# Patient Record
Sex: FEMALE | Race: WHITE | NOT HISPANIC OR LATINO | Employment: OTHER | URBAN - METROPOLITAN AREA
[De-identification: names, ages, dates, MRNs, and addresses within clinical notes are randomized per-mention and may not be internally consistent; named-entity substitution may affect disease eponyms.]

---

## 2018-04-20 ENCOUNTER — OFFICE VISIT (OUTPATIENT)
Dept: NEUROSURGERY | Facility: CLINIC | Age: 68
End: 2018-04-20

## 2018-04-20 VITALS — WEIGHT: 148 LBS | TEMPERATURE: 97.8 F | BODY MASS INDEX: 21.92 KG/M2 | HEIGHT: 69 IN

## 2018-04-20 DIAGNOSIS — M47.812 CERVICAL SPONDYLOSIS WITHOUT MYELOPATHY: ICD-10-CM

## 2018-04-20 DIAGNOSIS — M50.30 DEGENERATIVE DISC DISEASE, CERVICAL: Primary | ICD-10-CM

## 2018-04-20 PROCEDURE — 99203 OFFICE O/P NEW LOW 30 MIN: CPT | Performed by: NEUROLOGICAL SURGERY

## 2018-04-20 RX ORDER — CETIRIZINE HYDROCHLORIDE 10 MG/1
10 TABLET ORAL DAILY
COMMUNITY

## 2018-04-20 NOTE — PROGRESS NOTES
Patient: Jeanne Love  : 1950    Primary Care Provider: PIERRE Nevarez    Requesting Provider: As above        History    Chief Complaint: Neck and left upper extremity pain.    History of Present Illness: Ms. Love is a 67-year-old retired woman who is known to my service.  I saw her in  with back problems at which time she was noted to have a spondylolisthesis and stenosis.  She also complained of some neck difficulties at that time.  Cervical traction was helpful.  About a year and a half ago she underwent right shoulder replacement.  Since that time she's had increasing neck stiffness and discomfort.  She has some pain in her left shoulder that on occasion will extend even into the forearm.  Her hand will feel heavy at times and she has some slight numbness on the left.  She's better resting her left arm.  Sitting with her arms at her side makes her symptoms worse.    Review of Systems   Constitutional: Negative for activity change, appetite change, chills, diaphoresis, fatigue, fever and unexpected weight change.   HENT: Negative for congestion, dental problem, drooling, ear discharge, ear pain, facial swelling, hearing loss, mouth sores, nosebleeds, postnasal drip, rhinorrhea, sinus pressure, sneezing, sore throat, tinnitus, trouble swallowing and voice change.    Eyes: Negative for photophobia, pain, discharge, redness, itching and visual disturbance.   Respiratory: Negative for apnea, cough, choking, chest tightness, shortness of breath, wheezing and stridor.    Cardiovascular: Negative for chest pain, palpitations and leg swelling.   Gastrointestinal: Negative for abdominal distention, abdominal pain, anal bleeding, blood in stool, constipation, diarrhea, nausea, rectal pain and vomiting.   Endocrine: Negative for cold intolerance, heat intolerance, polydipsia, polyphagia and polyuria.   Genitourinary: Negative for decreased urine volume, difficulty urinating, dysuria,  "enuresis, flank pain, frequency, genital sores, hematuria and urgency.   Musculoskeletal: Positive for arthralgias and neck pain. Negative for back pain, gait problem, joint swelling, myalgias and neck stiffness.   Skin: Negative for color change, pallor, rash and wound.   Allergic/Immunologic: Negative for environmental allergies, food allergies and immunocompromised state.   Neurological: Negative for dizziness, tremors, seizures, syncope, facial asymmetry, speech difficulty, weakness, light-headedness, numbness and headaches.   Hematological: Negative for adenopathy. Does not bruise/bleed easily.   Psychiatric/Behavioral: Negative for agitation, behavioral problems, confusion, decreased concentration, dysphoric mood, hallucinations, self-injury, sleep disturbance and suicidal ideas. The patient is not nervous/anxious and is not hyperactive.        The patient's past medical history, past surgical history, family history, and social history have been reviewed at length in the electronic medical record.    Physical Exam:   Temp 97.8 °F (36.6 °C) (Temporal Artery )   Ht 175.3 cm (69\")   Wt 67.1 kg (148 lb)   BMI 21.86 kg/m²   CONSTITUTIONAL: Patient is well-nourished, pleasant and appears stated age.  CV: Heart regular rate and rhythm without murmur, rub, or gallop.  PULMONARY: Lungs are clear to ascultation.  MUSCULOSKELETAL:  Neck tenderness to palpation is not observed.   ROM in neck is normal.  Ranging her left shoulder induces some pain.  NEUROLOGICAL:  Orientation, memory, attention span, language function, and cognition have been examined and are intact.  Strength is intact in the upper and lower extremities to direct testing.  Muscle tone is normal throughout.  Station and gait are normal.  Sensation is intact to light touch testing throughout.  Deep tendon reflexes are 1+ and symmetrical.  Alvin's Sign is negative bilaterally. No clonus is elicited.  Coordination is intact.      Medical Decision " Making    Data Review:   MRI of the cervical spine dated 3/27/18 demonstrates some mild diffuse degenerative disc disease and mild spondylosis.  There is some recess narrowing due to uncinate overgrowth bilaterally at C4-5, right greater than left.    Diagnosis:   1.  Cervical spondylosis with potential low-grade left upper extremity radiculopathy.  2.  Primary shoulder dysfunction.    Treatment Options:   I have referred the patient for physical therapy that will include traction.  She'll follow-up in my clinic in several weeks to check on her progress.  Findings on her cervical study are modest and I would certainly try and avoid aggressive interventions if at all possible.       Diagnosis Plan   1. Degenerative disc disease, cervical  Ambulatory Referral to Physical Therapy Evaluate and treat   2. Cervical spondylosis without myelopathy         Scribed for Kelby Arredondo MD by Josie Smith CMA on 04/20/2018 at 10:20 AM      I, Dr. Arredondo, personally performed the services described in the documentation, as scribed in my presence, and it is both accurate and complete.

## 2019-11-19 ENCOUNTER — LAB REQUISITION (OUTPATIENT)
Dept: LAB | Facility: HOSPITAL | Age: 69
End: 2019-11-19

## 2019-11-19 ENCOUNTER — OUTSIDE FACILITY SERVICE (OUTPATIENT)
Dept: GASTROENTEROLOGY | Facility: CLINIC | Age: 69
End: 2019-11-19

## 2019-11-19 DIAGNOSIS — R13.10 DYSPHAGIA, UNSPECIFIED: ICD-10-CM

## 2019-11-19 DIAGNOSIS — K21.00 GASTRO-ESOPHAGEAL REFLUX DISEASE WITH ESOPHAGITIS: ICD-10-CM

## 2019-11-19 PROCEDURE — 43249 ESOPH EGD DILATION <30 MM: CPT | Performed by: INTERNAL MEDICINE

## 2019-11-19 PROCEDURE — G0500 MOD SEDAT ENDO SERVICE >5YRS: HCPCS | Performed by: INTERNAL MEDICINE

## 2019-11-19 PROCEDURE — 43239 EGD BIOPSY SINGLE/MULTIPLE: CPT | Performed by: INTERNAL MEDICINE

## 2019-11-19 PROCEDURE — 88305 TISSUE EXAM BY PATHOLOGIST: CPT | Performed by: INTERNAL MEDICINE

## 2019-11-20 LAB
CYTO UR: NORMAL
LAB AP CASE REPORT: NORMAL
LAB AP CLINICAL INFORMATION: NORMAL
PATH REPORT.FINAL DX SPEC: NORMAL
PATH REPORT.GROSS SPEC: NORMAL

## 2020-06-24 ENCOUNTER — TELEPHONE (OUTPATIENT)
Dept: GASTROENTEROLOGY | Facility: CLINIC | Age: 70
End: 2020-06-24

## 2020-06-24 NOTE — TELEPHONE ENCOUNTER
PT CALLED TO SEE IF WE RECEIVED HER REFERRAL, I LOOKED AT HER CHART AND SHE HAD AN EGD WITH DR POST. PT EXPRESSED THAT SHE DID NOT WANT TO SEE DR POST. I LET HER KNOW ONCE WE RECEIVED HER REFERRAL I WOULD SEND A NOTE TO THE DRS TO MAKE SURE DR POST WILL LET HER GO AND DR REYES WILL ACCEPT HER AS A PT

## 2020-06-25 ENCOUNTER — TELEPHONE (OUTPATIENT)
Dept: GASTROENTEROLOGY | Facility: CLINIC | Age: 70
End: 2020-06-25

## 2023-05-11 ENCOUNTER — PRE-ADMISSION TESTING (OUTPATIENT)
Dept: PREADMISSION TESTING | Facility: HOSPITAL | Age: 73
End: 2023-05-11
Payer: MEDICARE

## 2023-05-11 VITALS — WEIGHT: 151.24 LBS | BODY MASS INDEX: 21.65 KG/M2 | HEIGHT: 70 IN

## 2023-05-11 LAB
ALBUMIN SERPL-MCNC: 4.1 G/DL (ref 3.5–5.2)
ALBUMIN/GLOB SERPL: 1.4 G/DL
ALP SERPL-CCNC: 95 U/L (ref 39–117)
ALT SERPL W P-5'-P-CCNC: 21 U/L (ref 1–33)
ANION GAP SERPL CALCULATED.3IONS-SCNC: 9 MMOL/L (ref 5–15)
APTT PPP: 31.7 SECONDS (ref 22–39)
AST SERPL-CCNC: 28 U/L (ref 1–32)
BASOPHILS # BLD AUTO: 0.03 10*3/MM3 (ref 0–0.2)
BASOPHILS NFR BLD AUTO: 0.5 % (ref 0–1.5)
BILIRUB SERPL-MCNC: 0.2 MG/DL (ref 0–1.2)
BUN SERPL-MCNC: 20 MG/DL (ref 8–23)
BUN/CREAT SERPL: 22.5 (ref 7–25)
CALCIUM SPEC-SCNC: 9.1 MG/DL (ref 8.6–10.5)
CHLORIDE SERPL-SCNC: 98 MMOL/L (ref 98–107)
CO2 SERPL-SCNC: 26 MMOL/L (ref 22–29)
CREAT SERPL-MCNC: 0.89 MG/DL (ref 0.57–1)
DEPRECATED RDW RBC AUTO: 45 FL (ref 37–54)
EGFRCR SERPLBLD CKD-EPI 2021: 69.4 ML/MIN/1.73
EOSINOPHIL # BLD AUTO: 0.2 10*3/MM3 (ref 0–0.4)
EOSINOPHIL NFR BLD AUTO: 3.2 % (ref 0.3–6.2)
ERYTHROCYTE [DISTWIDTH] IN BLOOD BY AUTOMATED COUNT: 13.2 % (ref 12.3–15.4)
GLOBULIN UR ELPH-MCNC: 3 GM/DL
GLUCOSE SERPL-MCNC: 95 MG/DL (ref 65–99)
HBA1C MFR BLD: 5.6 % (ref 4.8–5.6)
HCT VFR BLD AUTO: 40.4 % (ref 34–46.6)
HGB BLD-MCNC: 12.9 G/DL (ref 12–15.9)
IMM GRANULOCYTES # BLD AUTO: 0.01 10*3/MM3 (ref 0–0.05)
IMM GRANULOCYTES NFR BLD AUTO: 0.2 % (ref 0–0.5)
INR PPP: 0.95 (ref 0.89–1.12)
LYMPHOCYTES # BLD AUTO: 1.68 10*3/MM3 (ref 0.7–3.1)
LYMPHOCYTES NFR BLD AUTO: 27 % (ref 19.6–45.3)
MCH RBC QN AUTO: 29.7 PG (ref 26.6–33)
MCHC RBC AUTO-ENTMCNC: 31.9 G/DL (ref 31.5–35.7)
MCV RBC AUTO: 93.1 FL (ref 79–97)
MONOCYTES # BLD AUTO: 0.64 10*3/MM3 (ref 0.1–0.9)
MONOCYTES NFR BLD AUTO: 10.3 % (ref 5–12)
NEUTROPHILS NFR BLD AUTO: 3.66 10*3/MM3 (ref 1.7–7)
NEUTROPHILS NFR BLD AUTO: 58.8 % (ref 42.7–76)
NRBC BLD AUTO-RTO: 0 /100 WBC (ref 0–0.2)
PLATELET # BLD AUTO: 298 10*3/MM3 (ref 140–450)
PMV BLD AUTO: 8.4 FL (ref 6–12)
POTASSIUM SERPL-SCNC: 4.1 MMOL/L (ref 3.5–5.2)
PROT SERPL-MCNC: 7.1 G/DL (ref 6–8.5)
PROTHROMBIN TIME: 12.8 SECONDS (ref 12.2–14.5)
RBC # BLD AUTO: 4.34 10*6/MM3 (ref 3.77–5.28)
SODIUM SERPL-SCNC: 133 MMOL/L (ref 136–145)
WBC NRBC COR # BLD: 6.22 10*3/MM3 (ref 3.4–10.8)

## 2023-05-11 PROCEDURE — 85025 COMPLETE CBC W/AUTO DIFF WBC: CPT

## 2023-05-11 PROCEDURE — 83036 HEMOGLOBIN GLYCOSYLATED A1C: CPT

## 2023-05-11 PROCEDURE — 85730 THROMBOPLASTIN TIME PARTIAL: CPT

## 2023-05-11 PROCEDURE — 80053 COMPREHEN METABOLIC PANEL: CPT

## 2023-05-11 PROCEDURE — 85610 PROTHROMBIN TIME: CPT

## 2023-05-11 PROCEDURE — 93005 ELECTROCARDIOGRAM TRACING: CPT

## 2023-05-11 PROCEDURE — 36415 COLL VENOUS BLD VENIPUNCTURE: CPT

## 2023-05-11 NOTE — PAT
Patient viewed general PAT education video as instructed in their preoperative information received from their surgeon.  Patient stated the general PAT education video was viewed in its entirety and survey completed.  Copies of [facility] general education handouts (Incentive Spirometry, Meds to Beds Program, Patient Belongings, Pre-op skin preparation instructions, Blood Glucose testing, Visitor policy, Surgery FAQ, Code H) distributed to patient if not printed. Education related to the PAT pass and skin preparation for surgery (if applicable) completed in [facility] as a reinforcement to PAT education video. Patient instructed to return PAT pass provided today as well as completed skin preparation sheet (if applicable) on the day of procedure.     Additionally if patient had not viewed video yet but intended to view it at home or in our waiting area, then referred them to the handout with QR code/link provided during PAT visit.  Instructed patient to complete survey after viewing the video in its entirety.  Encouraged patient/family to read [facility] general education handouts thoroughly and notify PAT staff with any questions or concerns. Patient verbalized understanding of all information and priority content.    An arrival time for procedure was not provided during PAT visit. If patient had any questions or concerns about their arrival time, they were instructed to contact their surgeon/physician.  Additionally, if the patient referred to an arrival time that was acquired from their my chart account, patient was encouraged to verify that time with their surgeon/physician. Arrival times are NOT provided in Pre Admission Testing Department.    Patient's surgeon called in a prescription for Benzol Peroxide 5% wash to [facility].  Patient instructed to  from [facility] pharmacy that was submitted electronically.  Verbal and written instructions given regarding proper use of the Benzoyl Peroxide wash were provided to patient  and/or famlily during PAT visit. Patient/family also instructed to complete Benzol Peroxide checklist and return it to Pre-op on the day of surgery.  Patient and/or family verbalized understanding.      Additionally, reinforced with patient to acquire this prescription from the [facility] retail pharmacy before leaving the hospital after PAT visit due to the potential unavailability at local pharmacies.    Patient instructed to drink 20 ounces of Gatorade and it needs to be completed 1 hour (for Main OR patients) or 2 hours (scheduled  section & [facility] patients) before given arrival time for procedure (NO RED Gatorade)    Patient verbalized understanding.    Patient denies any current skin issues.     [employer] ([employer]) pain pump patient informational handout given to patient.  Instructed patient to watch [employer] Patient Education Video regarding Peripheral Nerve Catheter that will be in place for upcoming surgery unless contraindicated. The video can be accessed using QR code noted on handout.  Patient agreed to watch video.  Stressed to patient to call [facility] Nursing Hotline  if patient has any questions or concerns after discharge.     EKG from PAT today faxed to anesthesiology department for review and cardiac clearance. RN spoke with  ____DR HOANG___  and reviewed pertinent medical history and EKG results.  Per _____DR HOANG_____, patient is cleared to proceed with procedure as planned without additional cardiac testing. Patient denies chest pain or increased shortness of breath.

## 2023-05-15 LAB
QT INTERVAL: 398 MS
QTC INTERVAL: 444 MS

## 2023-05-17 ENCOUNTER — ANESTHESIA EVENT (OUTPATIENT)
Dept: PERIOP | Facility: HOSPITAL | Age: 73
End: 2023-05-17
Payer: MEDICARE

## 2023-05-17 RX ORDER — FAMOTIDINE 10 MG/ML
20 INJECTION, SOLUTION INTRAVENOUS ONCE
Status: CANCELLED | OUTPATIENT
Start: 2023-05-17 | End: 2023-05-17

## 2023-05-18 ENCOUNTER — ANESTHESIA (OUTPATIENT)
Dept: PERIOP | Facility: HOSPITAL | Age: 73
End: 2023-05-18
Payer: MEDICARE

## 2023-05-18 ENCOUNTER — ANESTHESIA EVENT CONVERTED (OUTPATIENT)
Dept: ANESTHESIOLOGY | Facility: HOSPITAL | Age: 73
End: 2023-05-18
Payer: MEDICARE

## 2023-05-18 ENCOUNTER — HOSPITAL ENCOUNTER (INPATIENT)
Facility: HOSPITAL | Age: 73
LOS: 1 days | Discharge: HOME | End: 2023-05-19
Attending: ORTHOPAEDIC SURGERY | Admitting: ORTHOPAEDIC SURGERY
Payer: MEDICARE

## 2023-05-18 ENCOUNTER — APPOINTMENT (OUTPATIENT)
Dept: GENERAL RADIOLOGY | Facility: HOSPITAL | Age: 73
End: 2023-05-18
Payer: MEDICARE

## 2023-05-18 DIAGNOSIS — Z98.890 HISTORY OF REVERSE TOTAL REPLACEMENT OF RIGHT SHOULDER JOINT: ICD-10-CM

## 2023-05-18 PROBLEM — T84.019A FAILED ARTHROPLASTY: Status: ACTIVE | Noted: 2023-05-18

## 2023-05-18 PROCEDURE — 25010000002 VANCOMYCIN 1 G RECONSTITUTED SOLUTION: Performed by: ORTHOPAEDIC SURGERY

## 2023-05-18 PROCEDURE — C1776 JOINT DEVICE (IMPLANTABLE): HCPCS | Performed by: ORTHOPAEDIC SURGERY

## 2023-05-18 PROCEDURE — 25010000002 DROPERIDOL PER 5 MG

## 2023-05-18 PROCEDURE — 25010000002 FENTANYL CITRATE (PF) 50 MCG/ML SOLUTION: Performed by: NURSE ANESTHETIST, CERTIFIED REGISTERED

## 2023-05-18 PROCEDURE — 87176 TISSUE HOMOGENIZATION CULTR: CPT | Performed by: ORTHOPAEDIC SURGERY

## 2023-05-18 PROCEDURE — 87015 SPECIMEN INFECT AGNT CONCNTJ: CPT | Performed by: ORTHOPAEDIC SURGERY

## 2023-05-18 PROCEDURE — 25010000002 ROPIVACAINE PER 1 MG: Performed by: NURSE ANESTHETIST, CERTIFIED REGISTERED

## 2023-05-18 PROCEDURE — 87070 CULTURE OTHR SPECIMN AEROBIC: CPT | Performed by: ORTHOPAEDIC SURGERY

## 2023-05-18 PROCEDURE — 25010000002 PHENYLEPHRINE 10 MG/ML SOLUTION 1 ML VIAL

## 2023-05-18 PROCEDURE — 87075 CULTR BACTERIA EXCEPT BLOOD: CPT | Performed by: ORTHOPAEDIC SURGERY

## 2023-05-18 PROCEDURE — 87102 FUNGUS ISOLATION CULTURE: CPT | Performed by: ORTHOPAEDIC SURGERY

## 2023-05-18 PROCEDURE — C1713 ANCHOR/SCREW BN/BN,TIS/BN: HCPCS | Performed by: ORTHOPAEDIC SURGERY

## 2023-05-18 PROCEDURE — 25010000002 CEFAZOLIN IN DEXTROSE 2-4 GM/100ML-% SOLUTION: Performed by: ORTHOPAEDIC SURGERY

## 2023-05-18 PROCEDURE — 87116 MYCOBACTERIA CULTURE: CPT | Performed by: ORTHOPAEDIC SURGERY

## 2023-05-18 PROCEDURE — 87206 SMEAR FLUORESCENT/ACID STAI: CPT | Performed by: ORTHOPAEDIC SURGERY

## 2023-05-18 PROCEDURE — 0RRJ00Z REPLACEMENT OF RIGHT SHOULDER JOINT WITH REVERSE BALL AND SOCKET SYNTHETIC SUBSTITUTE, OPEN APPROACH: ICD-10-PCS | Performed by: ORTHOPAEDIC SURGERY

## 2023-05-18 PROCEDURE — 87205 SMEAR GRAM STAIN: CPT | Performed by: ORTHOPAEDIC SURGERY

## 2023-05-18 PROCEDURE — 25010000002 SUGAMMADEX 200 MG/2ML SOLUTION

## 2023-05-18 PROCEDURE — 25010000002 DEXAMETHASONE PER 1 MG

## 2023-05-18 PROCEDURE — 25010000002 VANCOMYCIN 1 G RECONSTITUTED SOLUTION 1 EACH VIAL: Performed by: ORTHOPAEDIC SURGERY

## 2023-05-18 PROCEDURE — 25010000002 ONDANSETRON PER 1 MG: Performed by: ORTHOPAEDIC SURGERY

## 2023-05-18 PROCEDURE — 25010000002 DEXAMETHASONE SODIUM PHOSPHATE 10 MG/ML SOLUTION: Performed by: NURSE ANESTHETIST, CERTIFIED REGISTERED

## 2023-05-18 PROCEDURE — 25010000002 ONDANSETRON PER 1 MG

## 2023-05-18 PROCEDURE — G0378 HOSPITAL OBSERVATION PER HR: HCPCS

## 2023-05-18 PROCEDURE — 0RPJ0JZ REMOVAL OF SYNTHETIC SUBSTITUTE FROM RIGHT SHOULDER JOINT, OPEN APPROACH: ICD-10-PCS | Performed by: ORTHOPAEDIC SURGERY

## 2023-05-18 PROCEDURE — 25010000002 PROPOFOL 10 MG/ML EMULSION

## 2023-05-18 PROCEDURE — L3670 SO ACRO/CLAV CAN WEB PRE OTS: HCPCS | Performed by: ORTHOPAEDIC SURGERY

## 2023-05-18 PROCEDURE — 73020 X-RAY EXAM OF SHOULDER: CPT

## 2023-05-18 RX ORDER — MAGNESIUM HYDROXIDE 1200 MG/15ML
LIQUID ORAL AS NEEDED
Status: DISCONTINUED | OUTPATIENT
Start: 2023-05-18 | End: 2023-05-18 | Stop reason: HOSPADM

## 2023-05-18 RX ORDER — ONDANSETRON 4 MG/1
4 TABLET, FILM COATED ORAL EVERY 6 HOURS PRN
Status: DISCONTINUED | OUTPATIENT
Start: 2023-05-18 | End: 2023-05-19 | Stop reason: HOSPADM

## 2023-05-18 RX ORDER — SODIUM CHLORIDE 0.9 % (FLUSH) 0.9 %
3 SYRINGE (ML) INJECTION EVERY 12 HOURS SCHEDULED
Status: DISCONTINUED | OUTPATIENT
Start: 2023-05-18 | End: 2023-05-18 | Stop reason: HOSPADM

## 2023-05-18 RX ORDER — SODIUM CHLORIDE 450 MG/100ML
50 INJECTION, SOLUTION INTRAVENOUS CONTINUOUS
Status: DISCONTINUED | OUTPATIENT
Start: 2023-05-18 | End: 2023-05-19 | Stop reason: HOSPADM

## 2023-05-18 RX ORDER — ONDANSETRON 2 MG/ML
4 INJECTION INTRAMUSCULAR; INTRAVENOUS EVERY 6 HOURS PRN
Status: DISCONTINUED | OUTPATIENT
Start: 2023-05-18 | End: 2023-05-19 | Stop reason: HOSPADM

## 2023-05-18 RX ORDER — PROMETHAZINE HYDROCHLORIDE 25 MG/1
25 SUPPOSITORY RECTAL ONCE AS NEEDED
Status: DISCONTINUED | OUTPATIENT
Start: 2023-05-18 | End: 2023-05-18 | Stop reason: HOSPADM

## 2023-05-18 RX ORDER — HYDROMORPHONE HCL 110MG/55ML
0.5 PATIENT CONTROLLED ANALGESIA SYRINGE INTRAVENOUS
Status: DISCONTINUED | OUTPATIENT
Start: 2023-05-18 | End: 2023-05-19

## 2023-05-18 RX ORDER — LIDOCAINE HYDROCHLORIDE 10 MG/ML
0.5 INJECTION, SOLUTION EPIDURAL; INFILTRATION; INTRACAUDAL; PERINEURAL ONCE AS NEEDED
Status: COMPLETED | OUTPATIENT
Start: 2023-05-18 | End: 2023-05-18

## 2023-05-18 RX ORDER — ACETAMINOPHEN 500 MG
1000 TABLET ORAL ONCE
Status: COMPLETED | OUTPATIENT
Start: 2023-05-18 | End: 2023-05-18

## 2023-05-18 RX ORDER — TRANEXAMIC ACID 10 MG/ML
1000 INJECTION, SOLUTION INTRAVENOUS ONCE
Status: DISCONTINUED | OUTPATIENT
Start: 2023-05-18 | End: 2023-05-18

## 2023-05-18 RX ORDER — DEXAMETHASONE SODIUM PHOSPHATE 4 MG/ML
INJECTION, SOLUTION INTRA-ARTICULAR; INTRALESIONAL; INTRAMUSCULAR; INTRAVENOUS; SOFT TISSUE AS NEEDED
Status: DISCONTINUED | OUTPATIENT
Start: 2023-05-18 | End: 2023-05-18 | Stop reason: SURG

## 2023-05-18 RX ORDER — NALOXONE HCL 0.4 MG/ML
0.1 VIAL (ML) INJECTION
Status: DISCONTINUED | OUTPATIENT
Start: 2023-05-18 | End: 2023-05-19

## 2023-05-18 RX ORDER — EPHEDRINE SULFATE 50 MG/ML
INJECTION, SOLUTION INTRAVENOUS AS NEEDED
Status: DISCONTINUED | OUTPATIENT
Start: 2023-05-18 | End: 2023-05-18 | Stop reason: SURG

## 2023-05-18 RX ORDER — DROPERIDOL 2.5 MG/ML
INJECTION, SOLUTION INTRAMUSCULAR; INTRAVENOUS
Status: COMPLETED
Start: 2023-05-18 | End: 2023-05-18

## 2023-05-18 RX ORDER — CEFAZOLIN SODIUM 2 G/100ML
2 INJECTION, SOLUTION INTRAVENOUS EVERY 8 HOURS
Status: COMPLETED | OUTPATIENT
Start: 2023-05-18 | End: 2023-05-19

## 2023-05-18 RX ORDER — ROPIVACAINE HYDROCHLORIDE 2 MG/ML
INJECTION, SOLUTION EPIDURAL; INFILTRATION; PERINEURAL CONTINUOUS
Status: DISCONTINUED | OUTPATIENT
Start: 2023-05-18 | End: 2023-05-19 | Stop reason: HOSPADM

## 2023-05-18 RX ORDER — SODIUM CHLORIDE 0.9 % (FLUSH) 0.9 %
10 SYRINGE (ML) INJECTION EVERY 12 HOURS SCHEDULED
Status: DISCONTINUED | OUTPATIENT
Start: 2023-05-18 | End: 2023-05-18

## 2023-05-18 RX ORDER — ACETAMINOPHEN 650 MG/1
650 SUPPOSITORY RECTAL EVERY 4 HOURS PRN
Status: DISCONTINUED | OUTPATIENT
Start: 2023-05-18 | End: 2023-05-19 | Stop reason: HOSPADM

## 2023-05-18 RX ORDER — ONDANSETRON 2 MG/ML
4 INJECTION INTRAMUSCULAR; INTRAVENOUS ONCE AS NEEDED
Status: DISCONTINUED | OUTPATIENT
Start: 2023-05-18 | End: 2023-05-18 | Stop reason: HOSPADM

## 2023-05-18 RX ORDER — CEFAZOLIN SODIUM 2 G/100ML
2 INJECTION, SOLUTION INTRAVENOUS ONCE
Status: COMPLETED | OUTPATIENT
Start: 2023-05-18 | End: 2023-05-18

## 2023-05-18 RX ORDER — DROPERIDOL 2.5 MG/ML
0.62 INJECTION, SOLUTION INTRAMUSCULAR; INTRAVENOUS
Status: DISCONTINUED | OUTPATIENT
Start: 2023-05-18 | End: 2023-05-18 | Stop reason: HOSPADM

## 2023-05-18 RX ORDER — MIDAZOLAM HYDROCHLORIDE 1 MG/ML
0.5 INJECTION INTRAMUSCULAR; INTRAVENOUS
Status: DISCONTINUED | OUTPATIENT
Start: 2023-05-18 | End: 2023-05-18

## 2023-05-18 RX ORDER — FENTANYL CITRATE 50 UG/ML
INJECTION, SOLUTION INTRAMUSCULAR; INTRAVENOUS
Status: COMPLETED | OUTPATIENT
Start: 2023-05-18 | End: 2023-05-18

## 2023-05-18 RX ORDER — OXYCODONE HYDROCHLORIDE 5 MG/1
5 TABLET ORAL EVERY 4 HOURS PRN
Status: DISCONTINUED | OUTPATIENT
Start: 2023-05-18 | End: 2023-05-18

## 2023-05-18 RX ORDER — HYDROCODONE BITARTRATE AND ACETAMINOPHEN 5; 325 MG/1; MG/1
1 TABLET ORAL EVERY 4 HOURS PRN
Status: DISCONTINUED | OUTPATIENT
Start: 2023-05-18 | End: 2023-05-19 | Stop reason: HOSPADM

## 2023-05-18 RX ORDER — LABETALOL HYDROCHLORIDE 5 MG/ML
5 INJECTION, SOLUTION INTRAVENOUS
Status: DISCONTINUED | OUTPATIENT
Start: 2023-05-18 | End: 2023-05-18 | Stop reason: HOSPADM

## 2023-05-18 RX ORDER — DROPERIDOL 2.5 MG/ML
0.62 INJECTION, SOLUTION INTRAMUSCULAR; INTRAVENOUS ONCE AS NEEDED
Status: DISCONTINUED | OUTPATIENT
Start: 2023-05-18 | End: 2023-05-18 | Stop reason: HOSPADM

## 2023-05-18 RX ORDER — VANCOMYCIN HYDROCHLORIDE 1 G/20ML
INJECTION, POWDER, LYOPHILIZED, FOR SOLUTION INTRAVENOUS AS NEEDED
Status: DISCONTINUED | OUTPATIENT
Start: 2023-05-18 | End: 2023-05-18 | Stop reason: HOSPADM

## 2023-05-18 RX ORDER — ROCURONIUM BROMIDE 10 MG/ML
INJECTION, SOLUTION INTRAVENOUS AS NEEDED
Status: DISCONTINUED | OUTPATIENT
Start: 2023-05-18 | End: 2023-05-18 | Stop reason: SURG

## 2023-05-18 RX ORDER — BUPIVACAINE HYDROCHLORIDE 2.5 MG/ML
INJECTION, SOLUTION EPIDURAL; INFILTRATION; INTRACAUDAL
Status: COMPLETED | OUTPATIENT
Start: 2023-05-18 | End: 2023-05-18

## 2023-05-18 RX ORDER — PREGABALIN 75 MG/1
75 CAPSULE ORAL ONCE
Status: COMPLETED | OUTPATIENT
Start: 2023-05-18 | End: 2023-05-18

## 2023-05-18 RX ORDER — GLYCOPYRROLATE 0.2 MG/ML
INJECTION INTRAMUSCULAR; INTRAVENOUS AS NEEDED
Status: DISCONTINUED | OUTPATIENT
Start: 2023-05-18 | End: 2023-05-18 | Stop reason: SURG

## 2023-05-18 RX ORDER — PROMETHAZINE HYDROCHLORIDE 25 MG/1
25 TABLET ORAL ONCE AS NEEDED
Status: DISCONTINUED | OUTPATIENT
Start: 2023-05-18 | End: 2023-05-18 | Stop reason: HOSPADM

## 2023-05-18 RX ORDER — DEXAMETHASONE SODIUM PHOSPHATE 10 MG/ML
INJECTION, SOLUTION INTRAMUSCULAR; INTRAVENOUS
Status: COMPLETED | OUTPATIENT
Start: 2023-05-18 | End: 2023-05-18

## 2023-05-18 RX ORDER — SODIUM CHLORIDE 0.9 % (FLUSH) 0.9 %
10 SYRINGE (ML) INJECTION AS NEEDED
Status: DISCONTINUED | OUTPATIENT
Start: 2023-05-18 | End: 2023-05-18

## 2023-05-18 RX ORDER — HYDROCODONE BITARTRATE AND ACETAMINOPHEN 5; 325 MG/1; MG/1
1 TABLET ORAL ONCE AS NEEDED
Status: DISCONTINUED | OUTPATIENT
Start: 2023-05-18 | End: 2023-05-18 | Stop reason: HOSPADM

## 2023-05-18 RX ORDER — FENTANYL CITRATE 50 UG/ML
50 INJECTION, SOLUTION INTRAMUSCULAR; INTRAVENOUS
Status: DISCONTINUED | OUTPATIENT
Start: 2023-05-18 | End: 2023-05-18 | Stop reason: HOSPADM

## 2023-05-18 RX ORDER — PROPOFOL 10 MG/ML
VIAL (ML) INTRAVENOUS AS NEEDED
Status: DISCONTINUED | OUTPATIENT
Start: 2023-05-18 | End: 2023-05-18 | Stop reason: SURG

## 2023-05-18 RX ORDER — IPRATROPIUM BROMIDE AND ALBUTEROL SULFATE 2.5; .5 MG/3ML; MG/3ML
3 SOLUTION RESPIRATORY (INHALATION) ONCE AS NEEDED
Status: DISCONTINUED | OUTPATIENT
Start: 2023-05-18 | End: 2023-05-18 | Stop reason: HOSPADM

## 2023-05-18 RX ORDER — SODIUM CHLORIDE 9 MG/ML
40 INJECTION, SOLUTION INTRAVENOUS AS NEEDED
Status: DISCONTINUED | OUTPATIENT
Start: 2023-05-18 | End: 2023-05-18

## 2023-05-18 RX ORDER — SODIUM CHLORIDE, SODIUM LACTATE, POTASSIUM CHLORIDE, CALCIUM CHLORIDE 600; 310; 30; 20 MG/100ML; MG/100ML; MG/100ML; MG/100ML
9 INJECTION, SOLUTION INTRAVENOUS CONTINUOUS
Status: DISCONTINUED | OUTPATIENT
Start: 2023-05-18 | End: 2023-05-18

## 2023-05-18 RX ORDER — PHENYLEPHRINE HCL IN 0.9% NACL 1 MG/10 ML
SYRINGE (ML) INTRAVENOUS AS NEEDED
Status: DISCONTINUED | OUTPATIENT
Start: 2023-05-18 | End: 2023-05-18 | Stop reason: SURG

## 2023-05-18 RX ORDER — TRANEXAMIC ACID 10 MG/ML
1000 INJECTION, SOLUTION INTRAVENOUS ONCE
Status: COMPLETED | OUTPATIENT
Start: 2023-05-18 | End: 2023-05-18

## 2023-05-18 RX ORDER — LABETALOL HYDROCHLORIDE 5 MG/ML
10 INJECTION, SOLUTION INTRAVENOUS EVERY 4 HOURS PRN
Status: DISCONTINUED | OUTPATIENT
Start: 2023-05-18 | End: 2023-05-19 | Stop reason: HOSPADM

## 2023-05-18 RX ORDER — SODIUM CHLORIDE 9 MG/ML
40 INJECTION, SOLUTION INTRAVENOUS AS NEEDED
Status: DISCONTINUED | OUTPATIENT
Start: 2023-05-18 | End: 2023-05-18 | Stop reason: HOSPADM

## 2023-05-18 RX ORDER — LIDOCAINE HYDROCHLORIDE 10 MG/ML
INJECTION, SOLUTION EPIDURAL; INFILTRATION; INTRACAUDAL; PERINEURAL AS NEEDED
Status: DISCONTINUED | OUTPATIENT
Start: 2023-05-18 | End: 2023-05-18 | Stop reason: SURG

## 2023-05-18 RX ORDER — ACETAMINOPHEN 325 MG/1
650 TABLET ORAL EVERY 4 HOURS PRN
Status: DISCONTINUED | OUTPATIENT
Start: 2023-05-18 | End: 2023-05-19 | Stop reason: HOSPADM

## 2023-05-18 RX ORDER — FAMOTIDINE 20 MG/1
20 TABLET, FILM COATED ORAL ONCE
Status: COMPLETED | OUTPATIENT
Start: 2023-05-18 | End: 2023-05-18

## 2023-05-18 RX ORDER — DEXMEDETOMIDINE HYDROCHLORIDE 100 UG/ML
INJECTION, SOLUTION INTRAVENOUS AS NEEDED
Status: DISCONTINUED | OUTPATIENT
Start: 2023-05-18 | End: 2023-05-18 | Stop reason: SURG

## 2023-05-18 RX ORDER — HYDRALAZINE HYDROCHLORIDE 20 MG/ML
5 INJECTION INTRAMUSCULAR; INTRAVENOUS
Status: DISCONTINUED | OUTPATIENT
Start: 2023-05-18 | End: 2023-05-18 | Stop reason: HOSPADM

## 2023-05-18 RX ORDER — CETIRIZINE HYDROCHLORIDE 10 MG/1
10 TABLET ORAL DAILY PRN
Status: DISCONTINUED | OUTPATIENT
Start: 2023-05-18 | End: 2023-05-19 | Stop reason: HOSPADM

## 2023-05-18 RX ORDER — ONDANSETRON 2 MG/ML
INJECTION INTRAMUSCULAR; INTRAVENOUS AS NEEDED
Status: DISCONTINUED | OUTPATIENT
Start: 2023-05-18 | End: 2023-05-18 | Stop reason: SDUPTHER

## 2023-05-18 RX ORDER — SODIUM CHLORIDE 0.9 % (FLUSH) 0.9 %
3-10 SYRINGE (ML) INJECTION AS NEEDED
Status: DISCONTINUED | OUTPATIENT
Start: 2023-05-18 | End: 2023-05-18 | Stop reason: HOSPADM

## 2023-05-18 RX ADMIN — DEXMEDETOMIDINE HYDROCHLORIDE 4 MCG: 100 INJECTION, SOLUTION INTRAVENOUS at 14:45

## 2023-05-18 RX ADMIN — DEXAMETHASONE SODIUM PHOSPHATE 2 MG: 10 INJECTION, SOLUTION INTRAMUSCULAR; INTRAVENOUS at 11:15

## 2023-05-18 RX ADMIN — Medication 1000 MG: at 16:34

## 2023-05-18 RX ADMIN — ROCURONIUM BROMIDE 10 MG: 10 SOLUTION INTRAVENOUS at 14:26

## 2023-05-18 RX ADMIN — LIDOCAINE HYDROCHLORIDE 50 MG: 10 INJECTION, SOLUTION EPIDURAL; INFILTRATION; INTRACAUDAL; PERINEURAL at 13:32

## 2023-05-18 RX ADMIN — BUPIVACAINE HYDROCHLORIDE 12 ML: 2.5 INJECTION, SOLUTION EPIDURAL; INFILTRATION; INTRACAUDAL at 10:58

## 2023-05-18 RX ADMIN — ROCURONIUM BROMIDE 20 MG: 10 SOLUTION INTRAVENOUS at 15:30

## 2023-05-18 RX ADMIN — Medication 100 MCG: at 14:09

## 2023-05-18 RX ADMIN — ROCURONIUM BROMIDE 10 MG: 10 SOLUTION INTRAVENOUS at 15:02

## 2023-05-18 RX ADMIN — DROPERIDOL 0.62 MG: 2.5 INJECTION, SOLUTION INTRAMUSCULAR; INTRAVENOUS at 17:25

## 2023-05-18 RX ADMIN — SODIUM CHLORIDE, POTASSIUM CHLORIDE, SODIUM LACTATE AND CALCIUM CHLORIDE 9 ML/HR: 600; 310; 30; 20 INJECTION, SOLUTION INTRAVENOUS at 11:03

## 2023-05-18 RX ADMIN — ONDANSETRON 4 MG: 2 INJECTION INTRAMUSCULAR; INTRAVENOUS at 21:45

## 2023-05-18 RX ADMIN — ONDANSETRON 4 MG: 2 INJECTION INTRAMUSCULAR; INTRAVENOUS at 15:56

## 2023-05-18 RX ADMIN — EPHEDRINE SULFATE 10 MG: 50 INJECTION INTRAVENOUS at 14:00

## 2023-05-18 RX ADMIN — DEXAMETHASONE SODIUM PHOSPHATE 4 MG: 4 INJECTION, SOLUTION INTRAMUSCULAR; INTRAVENOUS at 13:37

## 2023-05-18 RX ADMIN — EPHEDRINE SULFATE 10 MG: 50 INJECTION INTRAVENOUS at 13:47

## 2023-05-18 RX ADMIN — BUPIVACAINE HYDROCHLORIDE 25 ML: 2.5 INJECTION, SOLUTION EPIDURAL; INFILTRATION; INTRACAUDAL at 11:15

## 2023-05-18 RX ADMIN — CEFAZOLIN SODIUM 2 G: 2 INJECTION, SOLUTION INTRAVENOUS at 21:11

## 2023-05-18 RX ADMIN — DEXMEDETOMIDINE HYDROCHLORIDE 8 MCG: 100 INJECTION, SOLUTION INTRAVENOUS at 13:32

## 2023-05-18 RX ADMIN — ROCURONIUM BROMIDE 10 MG: 10 SOLUTION INTRAVENOUS at 13:57

## 2023-05-18 RX ADMIN — FENTANYL CITRATE 100 MCG: 50 INJECTION, SOLUTION INTRAMUSCULAR; INTRAVENOUS at 10:58

## 2023-05-18 RX ADMIN — ROCURONIUM BROMIDE 70 MG: 10 SOLUTION INTRAVENOUS at 13:32

## 2023-05-18 RX ADMIN — LIDOCAINE HYDROCHLORIDE 0.5 ML: 10 INJECTION, SOLUTION EPIDURAL; INFILTRATION; INTRACAUDAL; PERINEURAL at 11:03

## 2023-05-18 RX ADMIN — CEFAZOLIN SODIUM 2 G: 2 INJECTION, SOLUTION INTRAVENOUS at 13:37

## 2023-05-18 RX ADMIN — ACETAMINOPHEN 1000 MG: 500 TABLET ORAL at 11:03

## 2023-05-18 RX ADMIN — PROPOFOL 200 MG: 10 INJECTION, EMULSION INTRAVENOUS at 13:32

## 2023-05-18 RX ADMIN — TRANEXAMIC ACID 1000 MG: 10 INJECTION, SOLUTION INTRAVENOUS at 15:54

## 2023-05-18 RX ADMIN — Medication 100 MCG: at 13:42

## 2023-05-18 RX ADMIN — TRANEXAMIC ACID 1000 MG: 10 INJECTION, SOLUTION INTRAVENOUS at 13:44

## 2023-05-18 RX ADMIN — GLYCOPYRROLATE 0.1 MCG: 0.4 INJECTION INTRAMUSCULAR; INTRAVENOUS at 13:43

## 2023-05-18 RX ADMIN — SUGAMMADEX 200 MG: 100 INJECTION, SOLUTION INTRAVENOUS at 16:02

## 2023-05-18 RX ADMIN — GLYCOPYRROLATE 0.1 MCG: 0.4 INJECTION INTRAMUSCULAR; INTRAVENOUS at 15:03

## 2023-05-18 RX ADMIN — PROPOFOL 25 MCG/KG/MIN: 10 INJECTION, EMULSION INTRAVENOUS at 13:42

## 2023-05-18 RX ADMIN — SODIUM CHLORIDE 50 ML/HR: 4.5 INJECTION, SOLUTION INTRAVENOUS at 17:44

## 2023-05-18 RX ADMIN — PHENYLEPHRINE HYDROCHLORIDE 10 MCG/MIN: 10 INJECTION INTRAVENOUS at 14:23

## 2023-05-18 RX ADMIN — PREGABALIN 75 MG: 75 CAPSULE ORAL at 11:03

## 2023-05-18 RX ADMIN — FAMOTIDINE 20 MG: 20 TABLET, FILM COATED ORAL at 11:03

## 2023-05-18 NOTE — H&P
Pre-Op H&P  Jeanne Love  [id number]  1950      Chief complaint: Right shoulder pain      Subjective:  Patient is a 72 y.o.female presents for scheduled surgery by Dr. Borrego. She anticipates a REVISION REVERSE TOTAL SHOULDER ARTHROPLASTY - RIGHT today. She had shoulder replacement in 2018. She had a fall last year and injured her right shoulder. She has had 3 previous shoulder surgeries. Conservative treatments failed.       Review of Systems:  Constitutional-- No fever, chills or sweats. No fatigue.  CV-- No chest pain, palpitation or syncope  Resp-- No SOB, cough, hemoptysis  Skin--No rashes or lesions      Allergies:   Allergies   Allergen Reactions   • Tramadol Hallucinations         Home Meds:  Medications Prior to Admission   Medication Sig Dispense Refill Last Dose   • cetirizine (zyrTEC) 10 MG tablet Take 1 tablet by mouth Daily As Needed for Allergies or Rhinitis.      • Conj Estrogens-Bazedoxifene (DUAVEE PO) Take 1 tablet by mouth Daily. NOT CURRENTLY USING            PMH:   Past Medical History:   Diagnosis Date   • Arthritis    • COVID     2020   • Hx of thumb surgery     bilateral   • Joint pain    • PONV (postoperative nausea and vomiting)    • Spondylolysis     LUMBAR   • Wears glasses      PSH:    Past Surgical History:   Procedure Laterality Date   • COLONOSCOPY     • HAND ARTHROPLASTY Bilateral     CMC JOINT ARTHROPLASTY   • JOINT REPLACEMENT Right     KNEE   • KNEE ARTHROSCOPY     • ROTATOR CUFF REPAIR     • TEETH EXTRACTION      IMPLANTS   • TOTAL SHOULDER ARTHROPLASTY W/ DISTAL CLAVICLE EXCISION Right 09/19/2016    Procedure: RIGHT TOTAL SHOULDER REVERSE ARTHROPLASTY, BICEPS TENODESIS;  Surgeon: Wilfrid Stanford MD;  Location: [facility];  Service:        Immunization History:  Influenza: No  Pneumococcal: No  Tetanus: UTD  Covid : No    Social History:   Tobacco:   Social History     Tobacco Use   Smoking Status Former   • Packs/day: 0.50   • Years: 2.00   • Pack years: 1.00    • Types: Cigarettes   • Quit date:    • Years since quittin.4   Smokeless Tobacco Never      Alcohol:     Social History     Substance and Sexual Activity   Alcohol Use No         Physical Exam: VS: /73  HR 77  RR 16 T 97.0  Sat 96%RA      General Appearance:    Alert, cooperative, no distress, appears stated age   Head:    Normocephalic, without obvious abnormality, atraumatic   Lungs:     Clear to auscultation bilaterally, respirations unlabored    Heart:   Regular rate and rhythm, S1 and S2 normal    Abdomen:    Soft without tenderness   Extremities:   Extremities normal, atraumatic, no cyanosis or edema   Skin:   Skin color, texture, turgor normal, no rashes or lesions   Neurologic:   Grossly intact     Results Review:     LABS:  Lab Results   Component Value Date    WBC 6.22 2023    HGB 12.9 2023    HCT 40.4 2023    MCV 93.1 2023     2023    NEUTROABS 3.66 2023    GLUCOSE 95 2023    BUN 20 2023    CREATININE 0.89 2023    EGFRIFNONA 72 2016     (L) 2023    K 4.1 2023    CL 98 2023    CO2 26.0 2023    CALCIUM 9.1 2023    ALBUMIN 4.1 2023    AST 28 2023    ALT 21 2023    BILITOT 0.2 2023       RADIOLOGY:  Imaging Results (Last 72 Hours)     ** No results found for the last 72 hours. **          I reviewed the patient's new clinical results.    Cancer Staging (if applicable)  Cancer Patient: __ yes __no __unknown; If yes, clinical stage T:__ N:__M:__, stage group or __N/A      Impression: Right shoulder pain      Plan: REVISION REVERSE TOTAL SHOULDER ARTHROPLASTY - RIGHT      Vinita Kline, PIERRE   2023   10:29 EDT

## 2023-05-18 NOTE — H&P
Patient Name: Jeanne Love  MRN: [id number]  : 1950  DOS: 2023    Attending: Jorge Borrego MD    Primary Care Provider: Anuradha Llanos APRN      Chief complaint: Right shoulder pain    Subjective   Patient is a pleasant 72 y.o. female presented for scheduled surgery by Dr. Borrego.  She anticipates right revision reverse total shoulder arthroplasty.  Her shoulder has been painful for many years.  She had her first shoulder surgery rotator cuff repair in  and a reverse total shoulder arthroplasty in 2018.  She had a fall last year that worsened her shoulder pain.  She denies history of DVT or PE.  She denies cardiac history.    When seen in preop, patient is resting comfortably.  She denies nausea, shortness of breath or chest pain.       Allergies:  Allergies   Allergen Reactions   • Tramadol Hallucinations       Meds:  Medications Prior to Admission   Medication Sig Dispense Refill Last Dose   • cetirizine (zyrTEC) 10 MG tablet Take 1 tablet by mouth Daily As Needed for Allergies or Rhinitis.   Past Week   • Conj Estrogens-Bazedoxifene (DUAVEE PO) Take 1 tablet by mouth Daily. NOT CURRENTLY USING            History:   Past Medical History:   Diagnosis Date   • Arthritis    • COVID        • Hx of thumb surgery     bilateral   • Joint pain    • PONV (postoperative nausea and vomiting)    • Spondylolysis     LUMBAR   • Wears glasses      Past Surgical History:   Procedure Laterality Date   • COLONOSCOPY     • HAND ARTHROPLASTY Bilateral     CMC JOINT ARTHROPLASTY   • JOINT REPLACEMENT Right     KNEE   • KNEE ARTHROSCOPY     • ROTATOR CUFF REPAIR     • TEETH EXTRACTION      IMPLANTS   • TOTAL SHOULDER ARTHROPLASTY W/ DISTAL CLAVICLE EXCISION Right 2016    Procedure: RIGHT TOTAL SHOULDER REVERSE ARTHROPLASTY, BICEPS TENODESIS;  Surgeon: Wilfrid Stanford MD;  Location: [facility];  Service:      History reviewed. No pertinent family history.  Social History  "    Tobacco Use   • Smoking status: Former     Packs/day: 0.50     Years: 2.00     Pack years: 1.00     Types: Cigarettes     Quit date: 1960     Years since quittin.4   • Smokeless tobacco: Never   Vaping Use   • Vaping Use: Never used   Substance Use Topics   • Alcohol use: No   • Drug use: No       Review of Systems  Pertinent items are noted in HPI, all other systems reviewed and negative    Vital Signs  /73 (BP Location: Right arm, Patient Position: Lying)   Pulse 66   Temp 97 °F (36.1 °C) (Temporal)   Resp 18   Ht 177.8 cm (70\")   Wt 68.5 kg (151 lb)   SpO2 96%   BMI 21.67 kg/m²     Physical Exam:    General Appearance:    Alert, cooperative, in no acute distress   Head:    Normocephalic, without obvious abnormality, atraumatic   Eyes:            Lids and lashes normal, conjunctivae and sclerae normal, no   icterus, no pallor, corneas clear,    Ears:    Ears appear intact with no abnormalities noted   Throat:   No oral lesions, no thrush, oral mucosa moist   Neck:   No adenopathy, supple, trachea midline, no thyromegaly         Lungs:     Clear to auscultation,respirations regular, even and                   unlabored    Heart:    Regular rhythm and normal rate, normal S1 and S2, no      murmur    Abdomen:     Normal bowel sounds, no masses, no organomegaly, soft        non-tender, non-distended, no guarding, no rebound                 tenderness   Genitalia:    Deferred   Extremities:  No swelling, no edema, no cyanosis  Distal pulses, cap refill, movements of fingers, wrist, intact.     Pulses:   Pulses palpable and equal bilaterally   Skin:   No bleeding, bruising or rash   Neurologic:   Cranial nerves 2 - 12 grossly intact      I reviewed the patient's new clinical results.             Invalid input(s): NEUTOPHILPCT,  EOSPCT        Invalid input(s): LABALBU, PROT  Lab Results   Component Value Date    HGBA1C 5.60 2023      Latest Reference Range & Units 23 15:23   Glucose 65 - " 99 mg/dL 95   Sodium 136 - 145 mmol/L 133 (L)   Potassium 3.5 - 5.2 mmol/L 4.1   CO2 22.0 - 29.0 mmol/L 26.0   Chloride 98 - 107 mmol/L 98   Anion Gap 5.0 - 15.0 mmol/L 9.0   Creatinine 0.57 - 1.00 mg/dL 0.89   BUN 8 - 23 mg/dL 20   BUN/Creatinine Ratio 7.0 - 25.0  22.5   Calcium 8.6 - 10.5 mg/dL 9.1   eGFR >60.0 mL/min/1.73 69.4   Alkaline Phosphatase 39 - 117 U/L 95   Total Protein 6.0 - 8.5 g/dL 7.1   ALT (SGPT) 1 - 33 U/L 21   AST (SGOT) 1 - 32 U/L 28   Total Bilirubin 0.0 - 1.2 mg/dL 0.2   Albumin 3.5 - 5.2 g/dL 4.1   Globulin gm/dL 3.0   A/G Ratio g/dL 1.4   Hemoglobin A1C 4.80 - 5.60 % 5.60   Protime 12.2 - 14.5 Seconds 12.8   INR 0.89 - 1.12  0.95   PTT 22.0 - 39.0 seconds 31.7   WBC 3.40 - 10.80 10*3/mm3 6.22   RBC 3.77 - 5.28 10*6/mm3 4.34   Hemoglobin 12.0 - 15.9 g/dL 12.9   Hematocrit 34.0 - 46.6 % 40.4   RDW 12.3 - 15.4 % 13.2   MCV 79.0 - 97.0 fL 93.1   MCH 26.6 - 33.0 pg 29.7   MCHC 31.5 - 35.7 g/dL 31.9   MPV 6.0 - 12.0 fL 8.4   Platelets 140 - 450 10*3/mm3 298   RDW-SD 37.0 - 54.0 fl 45.0   (L): Data is abnormally low      Assessment and Plan:       Failed arthroplasty      Plan    1. PT/OT. NWB, RUE, ROM hand, wrist, elbow.  2. Pain control-prns, interscalene nerve block cath with ropivacaine infusion.  3. IS-encourage  4. DVT proph- Mech/ mobilize.  5. Bowel regimen  6. Resume home medications as appropriate  7. Monitor post-op labs  8. DC planning home    Dragon disclaimer:  Part of this encounter note is an electronic transcription/translation of spoken language to printed text. The electronic translation of spoken language may permit erroneous, or at times, nonsensical words or phrases to be inadvertently transcribed; Although I have reviewed the note for such errors, some may still exist.    Electronically signed by PIERRE Davis, 05/18/23, 4:18 PM EDT.

## 2023-05-18 NOTE — ANESTHESIA PROCEDURE NOTES
PeCS I/II Block    Pre-sedation assessment completed: 5/18/2023 11:15 AM    Patient reassessed immediately prior to procedure    Patient location during procedure: OR  Start time: 5/18/2023 11:15 AM  Reason for block: at surgeon's request and post-op pain management  Performed by  CRNA/CAA: Chuck Pathak CRNA  Assisted by: Maty Mahan RN  Preanesthetic Checklist  Completed: patient identified, IV checked, site marked, risks and benefits discussed, surgical consent, monitors and equipment checked, pre-op evaluation and timeout performed  Prep:  Pt Position: left lateral decubitus  Sterile barriers:cap, gloves, mask and washed/disinfected hands  Prep: ChloraPrep  Patient monitoring: blood pressure monitoring, continuous pulse oximetry and EKG  Procedure    Sedation: yes  Performed under: local infiltration  Guidance:ultrasound guided and landmark technique  Images:still images obtained, printed/placed on chart    Laterality:right  Block Type:PECS I and PECS II  Injection Technique:single-shot  Needle Type:short-bevel  Needle Gauge:20 G  Resistance on Injection: none    Medications Used: dexamethasone sodium phosphate injection - Injection   2 mg - 5/18/2023 11:15:00 AM  bupivacaine PF (MARCAINE) 0.25 % injection - Injection   25 mL - 5/18/2023 11:15:00 AM      Medications  Preservative Free Saline:10ml  Comment:      Post Assessment  Injection Assessment: negative aspiration for heme, incremental injection and no paresthesia on injection  Patient Tolerance:comfortable throughout block  Complications:no  Additional Notes  Interpectoral-Pectoserratus Plane   A high-frequency linear transducer, with sterile cover, was placed medial to the coracoid process in the paramedian sagittal plane. The transducer was moved caudally to the 4th rib and rotated slightly to allow an in-plane needle trajectory from medial to lateral. Pectoralis Major Muscle (PMM), Pectoralis Minor Muscle (PmM), Thoracoacromial Artery, Ribs,  "and Pleura were identified under ultrasound. The insertion site was prepped in sterile fashion and then localized with 2-5 ml of 1% Lidocaine. Using ultrasound-guidance, a 20-gauge B-Shelton 4\" Ultraplex 360 non-stimulating echogenic needle was advanced in plane until the tip of the needle was in the fascial plane between the PMM and PmM, lateral to the Thoracoacromial Artery. 1-3ml of preservative free normal saline was used to hydro-dissect the fascial planes. After the fascial plane was verified, 10ml local anesthetic (LA) was injected for Interpectoral fascial plane block. The needle was continued along the same path to the level of the 4th rib below PmM.  Initially preservative free normal saline was used to confirm needle position and then 20 ml of LA was injected for Pectoserratus fascial plane block. Aspiration every 5 ml to prevent intravascular injection. Injection was completed with negative aspiration of blood and negative intravascular injection. Injection pressures were normal with minimal resistance.             "

## 2023-05-18 NOTE — ANESTHESIA POSTPROCEDURE EVALUATION
Patient: Jeanne Love    Procedure Summary     Date: 05/18/23 Room / Location:  [facility]    Anesthesia Start: 1325 Anesthesia Stop: 1638    Procedure: REVISION REVERSE TOTAL SHOULDER ARTHROPLASTY - RIGHT (Right: Shoulder) Diagnosis:       Failed arthroplasty      (failed reverse TSA)    Surgeons: Jorge Borrego MD Provider: Nando Cazares MD    Anesthesia Type: general ASA Status: 2          Anesthesia Type: general    Vitals  Vitals Value Taken Time   /53 05/18/23 1637   Temp 97 °F (36.1 °C) 05/18/23 1637   Pulse 77 05/18/23 1637   Resp 14 05/18/23 1637   SpO2 98 % 05/18/23 1637           Post Anesthesia Care and Evaluation    Patient location during evaluation: PACU  Patient participation: complete - patient participated  Level of consciousness: awake and alert  Pain score: 0  Pain management: adequate    Airway patency: patent  Anesthetic complications: No anesthetic complications  PONV Status: none  Cardiovascular status: hemodynamically stable and acceptable  Respiratory status: nonlabored ventilation, acceptable and nasal cannula  Hydration status: acceptable    Comments: Pt arrived to PACU with no issues during transport. Pt placed directly to monitors. Vital signs are within parameters. Pt maintaining ventilation spontaneously. No changes to dental. Report given to PACU and all question and concerns were addressed as well as the plan of care.

## 2023-05-18 NOTE — ANESTHESIA PROCEDURE NOTES
Airway  Urgency: elective    Date/Time: 5/18/2023 1:35 PM  Airway not difficult    General Information and Staff    Patient location during procedure: OR  CRNA/CAA: Lino Ward CRNA    Indications and Patient Condition  Indications for airway management: airway protection    Preoxygenated: yes  MILS not maintained throughout  Mask difficulty assessment: 1 - vent by mask    Final Airway Details  Final airway type: endotracheal airway      Successful airway: ETT  Cuffed: yes   Successful intubation technique: direct laryngoscopy  Endotracheal tube insertion site: oral  Blade: Shady  Blade size: 3  ETT size (mm): 7.0  Cormack-Lehane Classification: grade I - full view of glottis  Placement verified by: chest auscultation and capnometry   Cuff volume (mL): 10  Measured from: lips  ETT/EBT  to lips (cm): 22  Number of attempts at approach: 1  Assessment: lips, teeth, and gum same as pre-op and atraumatic intubation    Additional Comments  Negative epigastric sounds, Breath sound equal bilaterally with symmetric chest rise and fall

## 2023-05-18 NOTE — ANESTHESIA PROCEDURE NOTES
Interscalene Block    Pre-sedation assessment completed: 5/18/2023 10:58 AM    Patient reassessed immediately prior to procedure    Patient location during procedure: pre-op  Start time: 5/18/2023 10:58 AM  Reason for block: at surgeon's request and post-op pain management  Performed by  CRNA/CAA: Chuck Pathak CRNA  Assisted by: Maty Mahan RN  Preanesthetic Checklist  Completed: patient identified, IV checked, site marked, risks and benefits discussed, surgical consent, monitors and equipment checked, pre-op evaluation and timeout performed  Prep:  Sterile barriers:cap, gloves, mask and washed/disinfected hands  Prep: ChloraPrep  Patient monitoring: blood pressure monitoring, continuous pulse oximetry and EKG  Procedure    Sedation: yes  Performed under: local infiltration  Guidance:ultrasound guided    ULTRASOUND INTERPRETATION.  Using ultrasound guidance a 20 G gauge needle was placed in close proximity to the nerve, at which point, under ultrasound guidance anesthetic was injected in the area of the nerve and spread of the anesthesia was seen on ultrasound in close proximity thereto.  There were no abnormalities seen on ultrasound; a digital image was taken; and the patient tolerated the procedure with no complications. Images:still images obtained, printed/placed on chart    Laterality:right  Block Type:interscalene  Injection Technique:catheter  Needle Type:Tuohy and echogenic  Needle Gauge:18 G  Resistance on Injection: none  Catheter Size:20 G (20g)  Cath Depth at skin: 7 cm    Medications Used: fentaNYL citrate (PF) (SUBLIMAZE) injection - Intravenous   100 mcg - 5/18/2023 10:58:00 AM  bupivacaine PF (MARCAINE) 0.25 % injection - Injection   12 mL - 5/18/2023 10:58:00 AM      Medications  Preservative Free Saline:10ml    Post Assessment  Injection Assessment: negative aspiration for heme, no paresthesia on injection and incremental injection  Patient Tolerance:comfortable throughout  "block  Complications:no  Additional Notes  CATHETER  A high-frequency linear transducer, with sterile cover, was placed in the supraclavicular fossa to identify the subclavian artery and trunks and divisions of the brachial plexus. The transducer was then moved in a cephalad orientation with a slight rotation to continue visualization of the brachial plexus from the trunks and divisions, on to the C5-C7 roots. The insertion site was prepped and draped in sterile fashion. Skin and cutaneous tissue was infiltrated with 2-5 ml of 1% Lidocaine. Using ultrasound-guidance, an 18-gauge Contiplex Ultra 360 Touhy needle was advanced in plane from lateral to medial. Preservative-free normal saline was utilized for hydro-dissection of tissue, advancement of Touhy, and to confirm final needle placement at the fascial plane between the middle scalene muscle and sheath of the brachial plexus (C5-C7). A 20-gauge Contiplex Echo catheter was placed through the needle and advance out the tip of the Touhy 3-5 cm with the \"Argueta Flip\". The Touhy needle was then removed, and final catheter position verified lateral to the brachial plexus with local anesthetic (LA) and ultrasound visualization. The catheter was secured in the usual fashion with skin glue, benzoin, steri-strips, CHG tegaderm and label noting \"Nerve Block Catheter\". Jerk tape applied at yellow connector and catheter connection. All LA was injected in increments of 3-5 ml after catheter secured. Aspiration every 5 ml to prevent intravascular injection. Injection was completed with negative aspiration of blood and negative intravascular injection. Injection pressures were normal with minimal resistance.           "

## 2023-05-18 NOTE — ANESTHESIA PREPROCEDURE EVALUATION
Anesthesia Evaluation     history of anesthetic complications: PONV               Airway   Mallampati: I  TM distance: >3 FB  Neck ROM: full  No difficulty expected  Dental      Pulmonary    Cardiovascular     ECG reviewed        Neuro/Psych  GI/Hepatic/Renal/Endo      Musculoskeletal     Abdominal    Substance History      OB/GYN          Other   arthritis,                      Anesthesia Plan    ASA 2     general     (Isnb/c  Right marked)  intravenous induction     Anesthetic plan, risks, benefits, and alternatives have been provided, discussed and informed consent has been obtained with: patient.    Plan discussed with CRNA.        CODE STATUS:

## 2023-05-18 NOTE — OP NOTE
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 rsame        PROCEDURES PERFORMED:  1) right revision reverse total shoulder arthroplasty with allograft glenoid reconstruction -- humeral and glenoid components-   2) removal of reverse TSA humeral tray and glenoid components          SURGEON: Jorge Borrego MD           Assistant: Adrienne Rivera PA  ** Please note the physician assistant was medically necessary to assist with positioning retraction, arm positioning, care of soft tissues and closure      ANESTHESIA: General plus block.       ESTIMATED BLOOD LOSS:200mL.       COMPLICATIONS: None.       DISPOSITION: Recovery room in stable condition.      IMPLANTS:  Exactech:  36+0 std tray, 36 +0 std poly  Tornier:  Std, long peg glenoid baseplate, with 36 std sphere      Cultures:  Glenoid and humeral membrane x 2     INDICATIONS: This is a 72-year-old female with right shoulder pain and limited function and motion secondary to above diagnosis. They have failed conservative treatment and after a discussion of risks, benefits, and alternatives, wished to proceed with shoulder revision right shoulder arthroplasty.     DESCRIPTION OF PROCEDURE: On the day of surgery, the patient identified the right shoulder as the correct operative extremity. This was initialed by the surgeon with the patients's acknowledgment. The patient underwent placement of an interscalene block and was taken to the operating room and placed in the supine position. Upon induction of adequate anesthesia, the patient was brought up to the beach chair position and the shoulder and upper extremity were prepped and draped in the usual sterile fashion. Timeout confirmed the correct patient and operative extremity as well as that antibiotics were on board. A standard deltopectoral  approach to the shoulder was carried out. It was carried sharply through the skin and subcutaneous tissue. Medial and lateral flaps were developed over the deltopectoral fascia. The cephalic vein was identified and mobilized laterally with the deltoid. The subdeltoid and subpectoral spaces were mobilized and a blunt retractor was placed deep to this.  The inferior capsule was released directly off the humerus to allow greater than 90° of external rotation.     The humeral tray and poly were removed, and the stem was rotational and axially stressed and no loosening was noted    A head protector was placed. The humerus was subluxed posteriorly. The glenoid exposed.    The glenoid baseplate was grossly loose and easily removed.   There was a large anterior superior uncontained defect with severe central erosion.  We then freshened the native glenoid with a reamer and cayden. We then placed our central pin and sounded the glenoid.      We then prepped an allograft femoral head to fit the defect.   We then prepped for our new baseplate and tilted it to approx 15-20 degrees of inferior tilt to mitigate some shear stresses on the poor bone quality.  We then placed our baseplate and bone graft and impacted them and placed our 4 peripheral screws good screw fixation was noted.      The glenosphere was then inserted and locked into place with a set screw.  The humerus was carefully subluxed back anteriorly. A liner tray and polyethylene were placed and trialing was carried out. The appropriate final sizes were chosen and locked into place.  The shoulder was then reduced.  This allowed nearly full passive range of motion with no instability. The joint was copiously irrigated with orthopedic irrigation  after the final implants were assembled and locked into place.       vancomycin powder was placed in the wound       The deltopectoral interval was approximated with 0 Vicryl, the subcutaneous tissue with 2-0 Vicryl, and the skin  with nylon. A sterile dressing was placed. Anesthesia was reversed and the patient was taken to the recovery room in stable condition. All instrument, needle, and sponge counts were correct.       Jorge Borrego MD, MS

## 2023-05-19 VITALS
TEMPERATURE: 97.9 F | DIASTOLIC BLOOD PRESSURE: 59 MMHG | HEART RATE: 82 BPM | OXYGEN SATURATION: 95 % | SYSTOLIC BLOOD PRESSURE: 95 MMHG | HEIGHT: 70 IN | RESPIRATION RATE: 18 BRPM | BODY MASS INDEX: 21.62 KG/M2 | WEIGHT: 151 LBS

## 2023-05-19 PROBLEM — Z98.890 HISTORY OF REVERSE TOTAL REPLACEMENT OF RIGHT SHOULDER JOINT: Status: ACTIVE | Noted: 2023-05-19

## 2023-05-19 PROBLEM — Z96.611 HISTORY OF REVERSE TOTAL REPLACEMENT OF RIGHT SHOULDER JOINT: Status: ACTIVE | Noted: 2023-05-19

## 2023-05-19 PROBLEM — Z96.611 STATUS POST REVERSE TOTAL REPLACEMENT OF RIGHT SHOULDER: Status: ACTIVE | Noted: 2023-05-19

## 2023-05-19 LAB
ANION GAP SERPL CALCULATED.3IONS-SCNC: 8 MMOL/L (ref 5–15)
BACTERIA UR QL AUTO: ABNORMAL /HPF
BASOPHILS # BLD AUTO: 0.01 10*3/MM3 (ref 0–0.2)
BASOPHILS NFR BLD AUTO: 0.1 % (ref 0–1.5)
BILIRUB UR QL STRIP: NEGATIVE
BUN SERPL-MCNC: 9 MG/DL (ref 8–23)
BUN/CREAT SERPL: 13 (ref 7–25)
CALCIUM SPEC-SCNC: 9 MG/DL (ref 8.6–10.5)
CHLORIDE SERPL-SCNC: 99 MMOL/L (ref 98–107)
CLARITY UR: ABNORMAL
CO2 SERPL-SCNC: 24 MMOL/L (ref 22–29)
COLOR UR: YELLOW
CREAT SERPL-MCNC: 0.69 MG/DL (ref 0.57–1)
DEPRECATED RDW RBC AUTO: 43.8 FL (ref 37–54)
EGFRCR SERPLBLD CKD-EPI 2021: 92.9 ML/MIN/1.73
EOSINOPHIL # BLD AUTO: 0 10*3/MM3 (ref 0–0.4)
EOSINOPHIL NFR BLD AUTO: 0 % (ref 0.3–6.2)
ERYTHROCYTE [DISTWIDTH] IN BLOOD BY AUTOMATED COUNT: 13.2 % (ref 12.3–15.4)
GLUCOSE SERPL-MCNC: 108 MG/DL (ref 65–99)
GLUCOSE UR STRIP-MCNC: NEGATIVE MG/DL
HCT VFR BLD AUTO: 33.8 % (ref 34–46.6)
HGB BLD-MCNC: 11.2 G/DL (ref 12–15.9)
HGB UR QL STRIP.AUTO: NEGATIVE
HYALINE CASTS UR QL AUTO: ABNORMAL /LPF
IMM GRANULOCYTES # BLD AUTO: 0.05 10*3/MM3 (ref 0–0.05)
IMM GRANULOCYTES NFR BLD AUTO: 0.5 % (ref 0–0.5)
KETONES UR QL STRIP: NEGATIVE
LEUKOCYTE ESTERASE UR QL STRIP.AUTO: ABNORMAL
LYMPHOCYTES # BLD AUTO: 0.97 10*3/MM3 (ref 0.7–3.1)
LYMPHOCYTES NFR BLD AUTO: 9.1 % (ref 19.6–45.3)
MCH RBC QN AUTO: 30.1 PG (ref 26.6–33)
MCHC RBC AUTO-ENTMCNC: 33.1 G/DL (ref 31.5–35.7)
MCV RBC AUTO: 90.9 FL (ref 79–97)
MONOCYTES # BLD AUTO: 1.04 10*3/MM3 (ref 0.1–0.9)
MONOCYTES NFR BLD AUTO: 9.8 % (ref 5–12)
NEUTROPHILS NFR BLD AUTO: 8.58 10*3/MM3 (ref 1.7–7)
NEUTROPHILS NFR BLD AUTO: 80.5 % (ref 42.7–76)
NIGHT BLUE STAIN TISS: NORMAL
NIGHT BLUE STAIN TISS: NORMAL
NITRITE UR QL STRIP: NEGATIVE
NRBC BLD AUTO-RTO: 0 /100 WBC (ref 0–0.2)
PH UR STRIP.AUTO: 6.5 [PH] (ref 5–8)
PLATELET # BLD AUTO: 288 10*3/MM3 (ref 140–450)
PMV BLD AUTO: 8.5 FL (ref 6–12)
POTASSIUM SERPL-SCNC: 4.2 MMOL/L (ref 3.5–5.2)
PROT UR QL STRIP: NEGATIVE
RBC # BLD AUTO: 3.72 10*6/MM3 (ref 3.77–5.28)
RBC # UR STRIP: ABNORMAL /HPF
REF LAB TEST METHOD: ABNORMAL
SODIUM SERPL-SCNC: 131 MMOL/L (ref 136–145)
SP GR UR STRIP: <=1.005 (ref 1–1.03)
SQUAMOUS #/AREA URNS HPF: ABNORMAL /HPF
TRANS CELLS #/AREA URNS HPF: ABNORMAL /HPF
UROBILINOGEN UR QL STRIP: ABNORMAL
WBC # UR STRIP: ABNORMAL /HPF
WBC NRBC COR # BLD: 10.65 10*3/MM3 (ref 3.4–10.8)

## 2023-05-19 PROCEDURE — 85025 COMPLETE CBC W/AUTO DIFF WBC: CPT | Performed by: ORTHOPAEDIC SURGERY

## 2023-05-19 PROCEDURE — 25010000002 CEFAZOLIN IN DEXTROSE 2-4 GM/100ML-% SOLUTION: Performed by: ORTHOPAEDIC SURGERY

## 2023-05-19 PROCEDURE — 81001 URINALYSIS AUTO W/SCOPE: CPT

## 2023-05-19 PROCEDURE — 97166 OT EVAL MOD COMPLEX 45 MIN: CPT

## 2023-05-19 PROCEDURE — 80048 BASIC METABOLIC PNL TOTAL CA: CPT | Performed by: ORTHOPAEDIC SURGERY

## 2023-05-19 PROCEDURE — 97116 GAIT TRAINING THERAPY: CPT

## 2023-05-19 PROCEDURE — 97161 PT EVAL LOW COMPLEX 20 MIN: CPT

## 2023-05-19 PROCEDURE — 97530 THERAPEUTIC ACTIVITIES: CPT

## 2023-05-19 PROCEDURE — 25010000002 ONDANSETRON PER 1 MG: Performed by: ORTHOPAEDIC SURGERY

## 2023-05-19 PROCEDURE — 87086 URINE CULTURE/COLONY COUNT: CPT

## 2023-05-19 RX ORDER — NALOXONE HCL 0.4 MG/ML
0.1 VIAL (ML) INJECTION
Status: DISCONTINUED | OUTPATIENT
Start: 2023-05-19 | End: 2023-05-19 | Stop reason: HOSPADM

## 2023-05-19 RX ORDER — HYDROMORPHONE HYDROCHLORIDE 1 MG/ML
0.5 INJECTION, SOLUTION INTRAMUSCULAR; INTRAVENOUS; SUBCUTANEOUS
Status: DISCONTINUED | OUTPATIENT
Start: 2023-05-19 | End: 2023-05-19 | Stop reason: HOSPADM

## 2023-05-19 RX ORDER — ONDANSETRON 4 MG/1
4 TABLET, FILM COATED ORAL EVERY 8 HOURS PRN
Qty: 20 TABLET | Refills: 0 | Status: SHIPPED | OUTPATIENT
Start: 2023-05-19

## 2023-05-19 RX ORDER — DOCUSATE SODIUM 100 MG/1
100 CAPSULE, LIQUID FILLED ORAL 2 TIMES DAILY
Qty: 30 CAPSULE | Refills: 0 | Status: SHIPPED | OUTPATIENT
Start: 2023-05-19 | End: 2023-06-03

## 2023-05-19 RX ORDER — HYDROCODONE BITARTRATE AND ACETAMINOPHEN 5; 325 MG/1; MG/1
1 TABLET ORAL EVERY 4 HOURS PRN
Qty: 40 TABLET | Refills: 0 | Status: SHIPPED | OUTPATIENT
Start: 2023-05-19 | End: 2023-05-28

## 2023-05-19 RX ADMIN — ONDANSETRON 4 MG: 2 INJECTION INTRAMUSCULAR; INTRAVENOUS at 12:33

## 2023-05-19 RX ADMIN — CEFAZOLIN SODIUM 2 G: 2 INJECTION, SOLUTION INTRAVENOUS at 04:21

## 2023-05-19 RX ADMIN — HYDROCODONE BITARTRATE AND ACETAMINOPHEN 1 TABLET: 5; 325 TABLET ORAL at 12:33

## 2023-05-19 NOTE — PLAN OF CARE
Goal Outcome Evaluation:      Pt able to ambulate to BR and within room several times w/ 1 assist and GB. Pt balance and gait very unsteady and pt impulsive. Cold therapy, sling, and immobilizer in place. Pain well controlled w/ Infusystem and cold therapy. No drainage in Prevena overnight. VSS on RA.

## 2023-05-19 NOTE — PLAN OF CARE
Problem: Adult Inpatient Plan of Care  Goal: Plan of Care Review  Recent Flowsheet Documentation  Taken 5/19/2023 1319 by Cristine Templeton OT  Progress: (IE) --  Plan of Care Reviewed With:   patient   spouse  Outcome Evaluation: Pt is A/Ox4 and motivated to work with therapy. Education completed for R shoulder precautions, sling teaching, and ADL retraining with instruction for wound vac/nerve block line mgmt to prevent dislodgement. RUE HEP completed with A/AROM @ hand, wrist, and elbow. Pt up with CGAx1. SPC issued for stability. OT emphasized teaching for home safety/fall prevention with pt and spouse. OT will d/c at this time. Plan is home with spouse assist today. No DME needs.

## 2023-05-19 NOTE — THERAPY EVALUATION
Patient Name: Jeanne Love  : 1950    MRN: [id number]                              Today's Date: 2023       Admit Date: 2023    Visit Dx:     ICD-10-CM ICD-9-CM   1. 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     Past Medical History:   Diagnosis Date   • Arthritis    • COVID        • Hx of thumb surgery     bilateral   • Joint pain    • PONV (postoperative nausea and vomiting)    • Spondylolysis     LUMBAR   • Wears glasses      Past Surgical History:   Procedure Laterality Date   • COLONOSCOPY     • HAND ARTHROPLASTY Bilateral     CMC JOINT ARTHROPLASTY   • JOINT REPLACEMENT Right     KNEE   • KNEE ARTHROSCOPY     • ROTATOR CUFF REPAIR     • TEETH EXTRACTION      IMPLANTS   • TOTAL SHOULDER ARTHROPLASTY W/ DISTAL CLAVICLE EXCISION Right 2016    Procedure: RIGHT TOTAL SHOULDER REVERSE ARTHROPLASTY, BICEPS TENODESIS;  Surgeon: Wilfrid Stanford MD;  Location: [facility] OR;  Service:       General Information     Row Name 23 1140          Physical Therapy Time and Intention    Document Type evaluation  -CM     Mode of Treatment physical therapy;individual therapy  -CM     Row Name 23 1140          General Information    Patient Profile Reviewed yes  -CM     Prior Level of Function independent:;all household mobility;ADL's  ind no AD  -CM     Existing Precautions/Restrictions fall;non-weight bearing;right;shoulder;brace on at all times;other (see comments)  NWB RUE, sling AAT, interscalene nerve cath, GENIE dressing  -CM     Barriers to Rehab none identified  -CM     Row Name 23 1140          Living Environment    People in Home spouse  -CM     Row Name 23 1140          Home Main Entrance    Number of Stairs, Main Entrance one  -CM     Stair  Railings, Main Entrance none  -CM     Row Name 05/19/23 1140          Stairs Within Home, Primary    Number of Stairs, Within Home, Primary none  -CM     Row Name 05/19/23 1140          Cognition    Orientation Status (Cognition) oriented x 4  -CM     Row Name 05/19/23 1140          Safety Issues, Functional Mobility    Safety Issues Affecting Function (Mobility) awareness of need for assistance;insight into deficits/self-awareness;safety precaution awareness;safety precautions follow-through/compliance  -CM     Impairments Affecting Function (Mobility) balance;pain  -CM           User Key  (r) = Recorded By, (t) = Taken By, (c) = Cosigned By    Initials Name Provider Type    CM Amy Paz, PT Physical Therapist               Mobility     Row Name 05/19/23 1143          Bed Mobility    Bed Mobility supine-sit  -CM     Supine-Sit [place] (Bed Mobility) independent  -CM     Comment, (Bed Mobility) patient performed bed mobility without difficulty transferring from supine to long sitting without difficulty, reviewed NWB RUE status but patient maintains well with her bed mobility  -CM     Row Name 05/19/23 1143          Sit-Stand Transfer    Sit-Stand [place] (Transfers) contact guard  -CM     Row Name 05/19/23 1143          Gait/Stairs (Locomotion)    [place] Level (Gait) contact guard  -CM     Assistive Device (Gait) cane, straight  -CM     Distance in Feet (Gait) 430  -CM     Deviations/Abnormal Patterns (Gait) bilateral deviations;base of support, narrow  -CM     Comment, (Gait/Stairs) Patient ambulated in [place] with a step through gait pattern initially unsupported. Her CHARLIE is narrow at times resulting in increased mediolateral sway and very mild LOB that she is able to correct herself with no physical assist. She completed final 100' of ambulation with a SPC, minor cues provided for sequencing. She demonstrates increased steadiness with use of SPC. Patient was provided with a SPC and  encouraged to use at D/C.  -CM     Row Name 05/19/23 1143          Mobility    Extremity Weight-bearing Status right upper extremity  -CM     Right Upper Extremity (Weight-bearing Status) non weight-bearing (NWB)  -CM           User Key  (r) = Recorded By, (t) = Taken By, (c) = Cosigned By    Initials Name Provider Type    Amy Gomez, PT Physical Therapist               Obj/Interventions     Row Name 05/19/23 1146          Range of Motion Comprehensive    General Range of Motion bilateral lower extremity ROM WFL  -CM     Row Name 05/19/23 1146          Strength Comprehensive (MMT)    General Manual Muscle Testing (MMT) Assessment lower extremity strength deficits identified  -CM     Comment, General Manual Muscle Testing (MMT) Assessment BLE grossly 4/5  -CM     Row Name 05/19/23 1146          Balance    Balance Assessment sitting static balance;standing static balance;standing dynamic balance  -CM     Static Sitting Balance independent  -CM     Position, Sitting Balance unsupported;sitting edge of bed  -CM     Static Standing Balance standby assist  -CM     Dynamic Standing Balance contact guard  -CM     Position/Device Used, Standing Balance supported;cane, straight  -CM     Comment, Balance mildly unsteady with periods of narrow CHARLIE, no overt LOB requiring assist to correct, improved steadiness with use of SPC  -CM     Row Name 05/19/23 1146          Sensory Assessment (Somatosensory)    Sensory Assessment (Somatosensory) LE sensation intact  -CM           User Key  (r) = Recorded By, (t) = Taken By, (c) = Cosigned By    Initials Name Provider Type    Amy Gomez, PT Physical Therapist               Goals/Plan     Row Name 05/19/23 1152          Gait Training Goal 1 (PT)    Activity/Assistive Device (Gait Training Goal 1, PT) gait (walking locomotion);cane, straight  -CM     [place] Level (Gait Training Goal 1, PT) modified independence  -CM     Distance (Gait Training Goal 1, PT)  500'  -CM     Time Frame (Gait Training Goal 1, PT) long term goal (LTG);3 days  -CM     Progress/Outcome (Gait Training Goal 1, PT) new goal  -CM     Row Name 05/19/23 1152          Stairs Goal 1 (PT)    Activity/Assistive Device (Stairs Goal 1, PT) ascending stairs;descending stairs;cane, straight  -CM     [place] Level/Cues Needed (Stairs Goal 1, PT) standby assist  -CM     Number of Stairs (Stairs Goal 1, PT) 1  -CM     Time Frame (Stairs Goal 1, PT) long term goal (LTG);3 days  -CM     Row Name 05/19/23 1152          Therapy Assessment/Plan (PT)    Planned Therapy Interventions (PT) balance training;stair training;strengthening;transfer training;gait training;bed mobility training  -CM           User Key  (r) = Recorded By, (t) = Taken By, (c) = Cosigned By    Initials Name Provider Type    CM Amy Paz, PT Physical Therapist               Clinical Impression     Row Name 05/19/23 1147          Pain    Pretreatment Pain Rating 7/10  -CM     Posttreatment Pain Rating 1/10  -CM     Pain Location - Side/Orientation Right  -CM     Pain Location generalized  -CM     Pain Location - shoulder  -CM     Pre/Posttreatment Pain Comment patient educated on nerve catheter PCA dose  -CM     Pain Intervention(s) Ambulation/increased activity;Repositioned;Nursing Notified  -CM     Row Name 05/19/23 8107          Plan of Care Review    Plan of Care Reviewed With patient  -CM     Outcome Evaluation Patient presents s/p R shoulder revision with mild balance deficits. She ambulated 430' CGA in [place] initially unsupported with mild unsteadiness showing improved steadiness with addition of SPC. IPPT is indicated to address current deficits, however she is cleared from a PT standpoint to D/C home with assist and use of SPC when medically appropriate.  -CM     Row Name 05/19/23 1147          Therapy Assessment/Plan (PT)    Rehab Potential (PT) good, to achieve stated therapy goals  -CM     Criteria for Skilled  Interventions Met (PT) yes;meets criteria  -CM     Therapy Frequency (PT) daily  -CM     Row Name 05/19/23 1147          Vital Signs    Pre SpO2 (%) 96  -CM     O2 Delivery Pre Treatment room air  -CM     Post SpO2 (%) 94  -CM     O2 Delivery Post Treatment room air  -CM     Pre Patient Position Supine  -CM     Intra Patient Position Standing  -CM     Post Patient Position Sitting  -CM     Row Name 05/19/23 1147          Positioning and Restraints    Pre-Treatment Position in bed  -CM     Post Treatment Position chair  -CM     In Chair reclined;call light within reach;encouraged to call for assist;exit alarm on;with family/caregiver;waffle cushion;notified nsg;with brace  -CM           User Key  (r) = Recorded By, (t) = Taken By, (c) = Cosigned By    Initials Name Provider Type    Amy Gomez PT Physical Therapist               Outcome Measures     Row Name 05/19/23 1153          How much help from another person do you currently need...    Turning from your back to your side while in flat bed without using bedrails? 4  -CM     Moving from lying on back to sitting on the side of a flat bed without bedrails? 4  -CM     Moving to and from a bed to a chair (including a wheelchair)? 3  -CM     Standing up from a chair using your arms (e.g., wheelchair, bedside chair)? 3  -CM     Climbing 3-5 steps with a railing? 3  -CM     To walk in hospital room? 3  -CM     AM-PAC 6 Clicks Score (PT) 20  -CM     Highest level of mobility 6 --> Walked 10 steps or more  -CM     Row Name 05/19/23 1153          Functional Assessment    Outcome Measure Options AM-PAC 6 Clicks Basic Mobility (PT)  -CM           User Key  (r) = Recorded By, (t) = Taken By, (c) = Cosigned By    Initials Name Provider Type    Amy Gomez PT Physical Therapist                             Physical Therapy Education     Title: PT OT SLP Therapies (In Progress)     Topic: Physical Therapy (In Progress)     Point: Mobility training (Done)      Learning Progress Summary           Patient Acceptance, E, VU by CM at 5/19/2023 1154   Significant Other Acceptance, E, VU by CM at 5/19/2023 1154                   Point: Home exercise program (Not Started)     Learner Progress:  Not documented in this visit.          Point: Body mechanics (Done)     Learning Progress Summary           Patient Acceptance, E, VU by CM at 5/19/2023 1154   Significant Other Acceptance, E, VU by CM at 5/19/2023 1154                   Point: Precautions (Done)     Learning Progress Summary           Patient Acceptance, E, VU by CM at 5/19/2023 1154   Significant Other Acceptance, E, VU by CM at 5/19/2023 1154                               User Key     Initials Effective Dates Name Provider Type Discipline    CM 09/22/22 -  Amy Paz, PT Physical Therapist PT              PT Recommendation and Plan  Planned Therapy Interventions (PT): balance training, stair training, strengthening, transfer training, gait training, bed mobility training  Plan of Care Reviewed With: patient  Outcome Evaluation: Patient presents s/p R shoulder revision with mild balance deficits. She ambulated 430' CGA in [place] initially unsupported with mild unsteadiness showing improved steadiness with addition of SPC. IPPT is indicated to address current deficits, however she is cleared from a PT standpoint to D/C home with assist and use of SPC when medically appropriate.     Time Calculation:    PT Charges     Row Name 05/19/23 1154             Time Calculation    Start Time 1032  -CM      PT Received On 05/19/23  -CM      PT Goal Re-Cert Due Date 05/29/23  -CM         Timed Charges    51832 - Gait Training Minutes  13  -CM         Untimed Charges    PT Eval/Re-eval Minutes 40  -CM         Total Minutes    Timed Charges Total Minutes 13  -CM      Untimed Charges Total Minutes 40  -CM       Total Minutes 53  -CM            User Key  (r) = Recorded By, (t) = Taken By, (c) = Cosigned By    Initials Name  Provider Type    CM Amy Paz, PT Physical Therapist              Therapy Charges for Today     Code Description Service Date Service Provider Modifiers Qty    95542993045 HC GAIT TRAINING EA 15 MIN 5/19/2023 Amy Paz, PT GP 1    [id number] HC PT EVAL LOW COMPLEXITY 3 5/19/2023 Amy Paz, PT GP 1          PT G-Codes  Outcome Measure Options: AM-PAC 6 Clicks Basic Mobility (PT)  AM-PAC 6 Clicks Score (PT): 20  PT Discharge Summary  Anticipated Discharge Disposition (PT): home with assist    Amy Paz, LEN  5/19/2023

## 2023-05-19 NOTE — THERAPY DISCHARGE NOTE
Acute Care - Occupational Therapy Discharge  [facility]    Patient Name: Jeanne Love  : 1950    MRN: [id number]                              Today's Date: 2023       Admit Date: 2023    Visit Dx:     ICD-10-CM ICD-9-CM   1. 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      Past Medical History:   Diagnosis Date   • Arthritis    • COVID        • Hx of thumb surgery     bilateral   • Joint pain    • PONV (postoperative nausea and vomiting)    • Spondylolysis     LUMBAR   • Wears glasses      Past Surgical History:   Procedure Laterality Date   • COLONOSCOPY     • HAND ARTHROPLASTY Bilateral     CMC JOINT ARTHROPLASTY   • JOINT REPLACEMENT Right     KNEE   • KNEE ARTHROSCOPY     • ROTATOR CUFF REPAIR     • TEETH EXTRACTION      IMPLANTS   • TOTAL SHOULDER ARTHROPLASTY W/ DISTAL CLAVICLE EXCISION Right 2016    Procedure: RIGHT TOTAL SHOULDER REVERSE ARTHROPLASTY, BICEPS TENODESIS;  Surgeon: Wilfrid Stanford MD;  Location: [facility];  Service:       General Information     Row Name 23 1300          OT Time and Intention    Document Type evaluation;discharge treatment  -TB     Mode of Treatment occupational therapy;individual therapy  -TB     Row Name 23 1300          General Information    Patient Profile Reviewed yes  -TB     Prior Level of Function independent:;all household mobility;ADL's  -TB     Existing Precautions/Restrictions fall;right;shoulder;non-weight bearing;brace on at all times;other (see comments)  s/p Revision R rTSA with NWB precautions, sling with abduction pillow, prevena wound vac, IS nerve block  -TB     Barriers to Rehab medically complex  -TB     Row Name 23 1300          Occupational Profile    Reason for Services/Referral  (Occupational Profile) Occupational decline  -TB     Environmental Supports and Barriers (Occupational Profile) Pt lives with her supportive spouse in a single story home with 1 step to enter. Tub/shower with grab bars and seat. Comfort ht commodes. No AD at baseline. Independent prior, limited by pain  -TB     Row Name 05/19/23 1300          Living Environment    People in Home spouse  -TB     Row Name 05/19/23 1300          Home Main Entrance    Number of Stairs, Main Entrance one  -TB     Stair Railings, Main Entrance none  -TB     Row Name 05/19/23 1300          Stairs Within Home, Primary    Number of Stairs, Within Home, Primary none  -TB     Row Name 05/19/23 1300          Cognition    Orientation Status (Cognition) oriented x 4  -TB     Row Name 05/19/23 1300          Safety Issues, Functional Mobility    Safety Issues Affecting Function (Mobility) insight into deficits/self-awareness;awareness of need for assistance;safety precaution awareness;safety precautions follow-through/compliance;judgment  -TB     Impairments Affecting Function (Mobility) balance;pain;range of motion (ROM);sensation/sensory awareness  -TB     Comment, Safety Issues/Impairments (Mobility) Pt up in room with CGA, cues for safety, and assist to manage lines.  -TB           User Key  (r) = Recorded By, (t) = Taken By, (c) = Cosigned By    Initials Name Provider Type    TB Cristine Templeton OT Occupational Therapist               Mobility/ADL's     Row Name 05/19/23 1304          Bed Mobility    Comment, (Bed Mobility) UIC  -TB     Row Name 05/19/23 1304          Transfers    Transfers sit-stand transfer;toilet transfer;bed-chair transfer  -TB     Comment, (Transfers) Education and cues for hand placement, sequencing, and safety  -TB     Row Name 05/19/23 1304          Bed-Chair Transfer    Bed-Chair Dent (Transfers) contact guard;verbal cues  -TB     Row Name 05/19/23 1304          Sit-Stand Transfer    Sit-Stand  Gillespie (Transfers) contact guard;verbal cues  -TB     Row Name 05/19/23 1304          Toilet Transfer    Type (Toilet Transfer) sit-stand;stand-sit  -TB     Gillespie Level (Toilet Transfer) contact guard;verbal cues  -TB     Assistive Device (Toilet Transfer) grab bars/safety frame;raised toilet seat  -     Row Name 05/19/23 1304          Functional Mobility    Functional Mobility- Ind. Level contact guard assist;verbal cues required  -TB     Functional Mobility-Distance (Feet) 40  -TB     Functional Mobility-Maintain WBing cues to maintain weight bearing status  -TB     Functional Mobility- Safety Issues balance decreased during turns  -TB     Functional Mobility- Comment Pt up in room with CGA, cues for safety, and assist to manage lines.  -     Row Name 05/19/23 1304          Activities of Daily Living    BADL Assessment/Intervention bathing;upper body dressing;lower body dressing;feeding;toileting;grooming  -     Row Name 05/19/23 1304          Mobility    Extremity Weight-bearing Status right upper extremity  -TB     Right Upper Extremity (Weight-bearing Status) non weight-bearing (NWB)   -     Row Name 05/19/23 1304          Bathing Assessment/Intervention    Gillespie Level (Bathing) moderate assist (50% patient effort);verbal cues;upper body;set up;proximal lower extremities;perineal area  -TB     Position (Bathing) unsupported sitting;supported standing  -TB     Comment, (Bathing) Education completed for R shoulder precautions, wound vac/nerve block precautions, and axilla care. Spouse present for teaching.  -     Row Name 05/19/23 1304          Upper Body Dressing Assessment/Training    Gillespie Level (Upper Body Dressing) doff;pajama/robe;don;front opening garment;maximum assist (25% patient effort);verbal cues  sling mgmt/proper fit  -TB     Position (Upper Body Dressing) unsupported sitting  -TB     Comment, (Upper Body Dressing) Education completed for R shoulder precautions,  sling teaching, and ADL retraining. Instruction provided for line mgmt to prevent dislodgement. Spouse present for teaching.  -TB     Row Name 05/19/23 1304          Lower Body Dressing Assessment/Training    Hood Level (Lower Body Dressing) don;pants/bottoms;moderate assist (50% patient effort);verbal cues  -TB     Position (Lower Body Dressing) unsupported sitting;supported standing  -TB     Row Name 05/19/23 1304          Self-Feeding Assessment/Training    Hood Level (Feeding) independent;liquids to mouth  -TB     Position (Self-Feeding) supported sitting  -TB     Row Name 05/19/23 1304          Toileting Assessment/Training    Hood Level (Toileting) contact guard assist;adjust/manage clothing;independent;perform perineal hygiene  -TB     Position (Toileting) unsupported sitting;supported standing  -TB     Row Name 05/19/23 1304          Grooming Assessment/Training    Hood Level (Grooming) set up;oral care regimen  -TB     Position (Grooming) supported sitting  -TB           User Key  (r) = Recorded By, (t) = Taken By, (c) = Cosigned By    Initials Name Provider Type    TB Cristine Templeton, OT Occupational Therapist               Obj/Interventions     Row Name 05/19/23 1314          Sensory Assessment (Somatosensory)    Sensory Assessment (Somatosensory) right UE  -TB     Right UE Sensory Assessment general sensation;impaired  -TB     Sensory Subjective Reports numbness;tingling  -TB     Sensory Assessment RUE IS block infusing  -TB     Row Name 05/19/23 1314          Vision Assessment/Intervention    Visual Impairment/Limitations corrective lenses for reading  -TB     Row Name 05/19/23 1314          Range of Motion Comprehensive    Comment, General Range of Motion LUE AROM WFL  -TB     Row Name 05/19/23 1314          Strength Comprehensive (MMT)    Comment, General Manual Muscle Testing (MMT) Assessment Pt is active at baseline and presents with good general strength  -TB      Row Name 05/19/23 1314          Elbow/Forearm (Therapeutic Exercise)    Elbow/Forearm (Therapeutic Exercise) AROM (active range of motion);AAROM (active assistive range of motion)  -     Elbow/Forearm AROM (Therapeutic Exercise) right;supination;pronation;sitting;10 repetitions  -TB     Elbow/Forearm AAROM (Therapeutic Exercise) right;flexion;extension;sitting;10 repetitions  -     Row Name 05/19/23 1314          Wrist (Therapeutic Exercise)    Wrist (Therapeutic Exercise) AROM (active range of motion)  -     Wrist AROM (Therapeutic Exercise) right;flexion;extension;10 repetitions  -TB     Row Name 05/19/23 1314          Hand (Therapeutic Exercise)    Hand (Therapeutic Exercise) AROM (active range of motion)  -     Hand AROM/AAROM (Therapeutic Exercise) right;AROM (active range of motion);finger flexion;finger extension;10 repetitions  -     Row Name 05/19/23 1314          Motor Skills    Therapeutic Exercise hand;wrist;elbow/forearm  Education completed for RUE HEP per MD parameters @ hand, wrist, and elbow. Pt demonstrates understanding. Spouse present for teaching.  -     Row Name 05/19/23 1314          Balance    Balance Assessment sitting dynamic balance;sit to stand dynamic balance;standing dynamic balance  -     Dynamic Sitting Balance supervision  -TB     Position, Sitting Balance unsupported;sitting in chair  commode  -     Sit to Stand Dynamic Balance contact guard;verbal cues  -     Dynamic Standing Balance contact guard  -     Position/Device Used, Standing Balance supported;cane, straight  -TB     Balance Interventions sitting;standing;sit to stand;supported;dynamic;occupation based/functional task;UE activity with balance activity  -     Comment, Balance Mildly unsteady, no over LOB. Education and cues for safety and fall prevention.  -TB           User Key  (r) = Recorded By, (t) = Taken By, (c) = Cosigned By    Initials Name Provider Type    TB Cristine Templeton, OT  Occupational Therapist               Goals/Plan    No documentation.                Clinical Impression     Row Name 05/19/23 1319          Pain Assessment    Pain Intervention(s) Ambulation/increased activity;Repositioned;Cold applied;Other (Comment)  RUE IS block infusing  -TB     Additional Documentation Pain Scale: FACES Pre/Post-Treatment (Group)  -TB     Row Name 05/19/23 1319          Pain Scale: FACES Pre/Post-Treatment    Pain: FACES Scale, Pretreatment 2-->hurts little bit  -TB     Posttreatment Pain Rating 2-->hurts little bit  -TB     Pain Location - Side/Orientation Right  -TB     Pain Location generalized  -TB     Pain Location - shoulder  -TB     Pre/Posttreatment Pain Comment Pt tolerates activity well  -TB     Row Name 05/19/23 1319          Plan of Care Review    Plan of Care Reviewed With patient;spouse  -TB     Progress --  IE  -TB     Outcome Evaluation Pt is A/Ox4 and motivated to work with therapy. Education completed for R shoulder precautions, sling teaching, and ADL retraining with instruction for wound vac/nerve block line mgmt to prevent dislodgement. RUE HEP completed with A/AROM @ hand, wrist, and elbow. Pt up with CGAx1. SPC issued for stability. OT emphasized teaching for home safety/fall prevention with pt and spouse. OT will d/c at this time. Plan is home with spouse assist today. No DME needs.  -TB     Row Name 05/19/23 1319          Therapy Assessment/Plan (OT)    Therapy Frequency (OT) evaluation only  -TB     Row Name 05/19/23 1319          Therapy Plan Review/Discharge Plan (OT)    Anticipated Discharge Disposition (OT) home with 24/7 care  -TB     Row Name 05/19/23 1319          Vital Signs    Pre Systolic BP Rehab --  RN cleared OT  -TB     Pre SpO2 (%) 97  -TB     O2 Delivery Pre Treatment room air  -TB     Pre Patient Position Sitting  -TB     Intra Patient Position Standing  -TB     Post Patient Position Sitting  -TB     Row Name 05/19/23 1319          Positioning and  Restraints    Pre-Treatment Position sitting in chair/recliner  -TB     Post Treatment Position chair  -TB     In Chair notified nsg;reclined;call light within reach;encouraged to call for assist;exit alarm on;with family/caregiver;legs elevated;with brace;waffle cushion  -TB           User Key  (r) = Recorded By, (t) = Taken By, (c) = Cosigned By    Initials Name Provider Type    TB Cristine Templeton, OT Occupational Therapist               Outcome Measures     Row Name 05/19/23 1329          How much help from another is currently needed...    Putting on and taking off regular lower body clothing? 2  -TB     Bathing (including washing, rinsing, and drying) 2  -TB     Toileting (which includes using toilet bed pan or urinal) 3  -TB     Putting on and taking off regular upper body clothing 2  -TB     Taking care of personal grooming (such as brushing teeth) 3  -TB     Eating meals 3  -TB     AM-PAC 6 Clicks Score (OT) 15  -TB     Row Name 05/19/23 1153          How much help from another person do you currently need...    Turning from your back to your side while in flat bed without using bedrails? 4  -CM     Moving from lying on back to sitting on the side of a flat bed without bedrails? 4  -CM     Moving to and from a bed to a chair (including a wheelchair)? 3  -CM     Standing up from a chair using your arms (e.g., wheelchair, bedside chair)? 3  -CM     Climbing 3-5 steps with a railing? 3  -CM     To walk in hospital room? 3  -CM     AM-PAC 6 Clicks Score (PT) 20  -CM     Highest level of mobility 6 --> Walked 10 steps or more  -CM     Row Name 05/19/23 1329 05/19/23 1153       Functional Assessment    Outcome Measure Options AM-PAC 6 Clicks Daily Activity (OT)  -TB AM-PAC 6 Clicks Basic Mobility (PT)  -CM          User Key  (r) = Recorded By, (t) = Taken By, (c) = Cosigned By    Initials Name Provider Type    TB Cristine Templeton, OT Occupational Therapist    CM Amy Paz, PT Physical  Therapist              Occupational Therapy Education     Title: PT OT SLP Therapies (In Progress)     Topic: Occupational Therapy (In Progress)     Point: ADL training (Done)     Description:   Instruct learner(s) on proper safety adaptation and remediation techniques during self care or transfers.   Instruct in proper use of assistive devices.              Learning Progress Summary           Patient Acceptance, E,D,TB, VU,DU,NR by TB at 5/19/2023 1330   Family Acceptance, E,D,TB, VU,DU,NR by TB at 5/19/2023 1330                   Point: Home exercise program (Done)     Description:   Instruct learner(s) on appropriate technique for monitoring, assisting and/or progressing therapeutic exercises/activities.              Learning Progress Summary           Patient Acceptance, E,D,TB, VU,DU,NR by TB at 5/19/2023 1330   Family Acceptance, E,D,TB, VU,DU,NR by TB at 5/19/2023 1330                   Point: Precautions (Done)     Description:   Instruct learner(s) on prescribed precautions during self-care and functional transfers.              Learning Progress Summary           Patient Acceptance, E,D,TB, VU,DU,NR by TB at 5/19/2023 1330   Family Acceptance, E,D,TB, VU,DU,NR by TB at 5/19/2023 1330                   Point: Body mechanics (Not Started)     Description:   Instruct learner(s) on proper positioning and spine alignment during self-care, functional mobility activities and/or exercises.              Learner Progress:  Not documented in this visit.                      User Key     Initials Effective Dates Name Provider Type Discipline     02/03/23 -  Cristine Templeton OT Occupational Therapist OT              OT Recommendation and Plan  Therapy Frequency (OT): evaluation only  Plan of Care Review  Plan of Care Reviewed With: patient, spouse  Progress:  (IE)  Outcome Evaluation: Pt is A/Ox4 and motivated to work with therapy. Education completed for R shoulder precautions, sling teaching, and ADL  retraining with instruction for wound vac/nerve block line mgmt to prevent dislodgement. RUE HEP completed with A/AROM @ hand, wrist, and elbow. Pt up with CGAx1. SPC issued for stability. OT emphasized teaching for home safety/fall prevention with pt and spouse. OT will d/c at this time. Plan is home with spouse assist today. No DME needs.  Plan of Care Reviewed With: patient, spouse  Outcome Evaluation: Pt is A/Ox4 and motivated to work with therapy. Education completed for R shoulder precautions, sling teaching, and ADL retraining with instruction for wound vac/nerve block line mgmt to prevent dislodgement. RUE HEP completed with A/AROM @ hand, wrist, and elbow. Pt up with CGAx1. SPC issued for stability. OT emphasized teaching for home safety/fall prevention with pt and spouse. OT will d/c at this time. Plan is home with spouse assist today. No DME needs.     Time Calculation:    Time Calculation- OT     Row Name 05/19/23 1154 05/19/23 1115          Time Calculation- OT    OT Start Time -- 1115  -TB     OT Received On -- 05/19/23  -TB        Timed Charges    52889 - Gait Training Minutes  13  -CM --     30818 - OT Therapeutic Activity Minutes -- 45  -TB        Untimed Charges    OT Eval/Re-eval Minutes -- 45  -TB        Total Minutes    Timed Charges Total Minutes 13  -CM 45  -TB     Untimed Charges Total Minutes -- 45  -TB      Total Minutes 13  -CM 90  -TB           User Key  (r) = Recorded By, (t) = Taken By, (c) = Cosigned By    Initials Name Provider Type    TB Cristine Templeton OT Occupational Therapist    CM Amy Paz, PT Physical Therapist              Therapy Charges for Today     Code Description Service Date Service Provider Modifiers Qty    02149900484  OT THERAPEUTIC ACT EA 15 MIN 5/19/2023 Cristine Templeton OT GO 3    [id number] HC OT EVAL MOD COMPLEXITY 3 5/19/2023 Cristine Templeton OT GO 1             OT Discharge Summary  Anticipated Discharge Disposition (OT):  home with 24/7 care    Cristine Templeton, OT  5/19/2023

## 2023-05-19 NOTE — CASE MANAGEMENT/SOCIAL WORK
Discharge Planning Assessment  [facility]     Patient Name: Jeanne Love  MRN: [id number]  Today's Date: 5/19/2023    Admit Date: 5/18/2023    Plan: Home with spouse   Discharge Needs Assessment     Row Name 05/19/23 0850       Living Environment    People in Home spouse    Name(s) of People in Home Lorne Love (spouse) [phone]    Current Living Arrangements home    Potentially Unsafe Housing Conditions none    Primary Care Provided by self    Provides Primary Care For no one    Family Caregiver if Needed spouse    Able to Return to Prior Arrangements yes       Resource/Environmental Concerns    Resource/Environmental Concerns none    Transportation Concerns none       Transition Planning    Patient/Family Anticipates Transition to home with family    Patient/Family Anticipated Services at Transition none    Transportation Anticipated family or friend will provide       Discharge Needs Assessment    Readmission Within the Last 30 Days no previous admission in last 30 days    Equipment Currently Used at Home none    Concerns to be Addressed denies needs/concerns at this time    Anticipated Changes Related to Illness none    Equipment Needed After Discharge none               Discharge Plan     Row Name 05/19/23 08       Plan    Plan Home with spouse    Patient/Family in Agreement with Plan yes    Plan Comments Spoke with patient at bedside. Lives with Lorne Love (spouse) [phone] in [place]. Is independent with ADL's. No problems with [employer] Medicare or medications. Uses no DME at home. Has no advanced directives. PCP is Anuradha Finch. Plan is home with family. Family will transport. CM will continue to follow.    Final Discharge Disposition Code 01 - home or self-care              Continued Care and Services - Admitted Since 5/18/2023    Coordination has not been started for this encounter.          Demographic Summary     Row Name 05/19/23 0849       General Information    Admission Type  observation    Arrived From PACU/recovery room    Referral Source admission list    Reason for Consult discharge planning    Preferred Language English       Contact Information    Permission Granted to Share Info With     Contact Information Obtained for                Functional Status     Row Name 05/19/23 0850       Functional Status    Usual Activity Tolerance good    Current Activity Tolerance good       Functional Status, IADL    Medications independent    Meal Preparation independent    Housekeeping independent    Laundry independent    Shopping independent       Mental Status    General Appearance WDL WDL       Mental Status Summary    Recent Changes in Mental Status/Cognitive Functioning no changes       Employment/    Employment Status retired               Psychosocial    No documentation.                Abuse/Neglect    No documentation.                Legal    No documentation.                Substance Abuse    No documentation.                Patient Forms    No documentation.                   Pavan Islas RN

## 2023-05-19 NOTE — DISCHARGE INSTRUCTIONS
Nerve Catheter Removal Instructions  When your device is empty:    Remove your catheter by pulling the dressing off slowly (like you would remove a regular bandage). The catheter should pull right out of the skin.  Check that the BLUE tip is intact.                                                                                     If the catheter is stuck, reposition your   extremity and pull slowly until removed.  *If catheter is HURTING and WON'T come out, stop and call [phone] for further assistance.    Remove medication bag from the black carrying case.  Cut the tubing on right and left side of pump, and discard the medication bag and tubing into garbage.  Place the pump and black carrying case into the plastic bag and then place this into the return box.  Seal box with blue stickers and return to [employer] postal service.    THIS IS PRE-PAID POSTAGE. SMI COLD THERAPY - PATIENT INSTRUCTION SHEET    Cold Compression Therapy for your comfort and rehabilitation  Your caregivers want you to be productive in your rehab and comfortable during your stay. In keeping with those goals, you will be receiving an SMI Cold Therapy Wrap to help ease post-operative pain and swelling that might keep you from getting back on track! Your SMI Cold Therapy Wrap is effective and simple-to-use, and you will be encouraged to apply it throughout your hospital stay and at home through the duration of your recovery.    When you are ready to go home  Be sure to take your SMI Cold Therapy Wrap and both sets of Gel Bags with you for continued comfort and use throughout your rehabilitation. If you don't already have them, ask your nurse or aide to retrieve your SMI Gel Bags from the patient freezer.    Home use precautions  Always follow your medical professional's application instructions upon discharge. Your SMI Cold Therapy Wrap and Gel Bags are designed to last for months following your surgery. Never heat the Gel Bags unless specified  by your healthcare provider. Supervision is advised when using this product on children or geriatric patients. To avoid danger of suffocation, please keep the outer plastic packaging away from children & pets.    Cold Therapy Instructions  Place Gel Bags in a freezer set ¾ of the way to max temperature for at least (4) hours. For best results, lay the Gel Bags flat and [id number] in the freezer. Once frozen, slide Gel Bags into the gel pouch and secure your wrap to the affected area with the straps.  Gel wraps that have been stored in a freezer for an extended period of time may require a (10) minute period of softening up in a room temperature environment before application.  The gel pouch acts as a protective barrier. NEVER place frozen bags directly onto skin, as this may cause frostbite injury.  The [facility] Cold Therapy Wrap is designed to be able to be worm while ambulating. The compression straps can be secured well enough so that the Wrap won't fall off while moving.  Wrap Application Videos can be viewed at [employer].  An additional protective barrier such as clothing, a washcloth, hand-towel or pillowcase may be used during prolonged treatment applications.  The Gel-Pouch and Wrap are both Latex-Free and the Gel Bag ingredients are non toxic.    [facility] Wrap care instructions  The [facility] Cold Therapy Wrap may be hand washed and hung to dry when needed.    [facility] re-order information  Additional [facility] body specific wraps and/or Gel Bags can be re-ordered from [employer] or call [employer] ([phone])   InfuBLOCK - Patient Information    What is a pain pump?  InfuBLOCK is a postoperative, non-narcotic pain relief system that delivers local anesthetic to or near the surgical site. This is a pain minimizing therapy that delivers an anesthetic (numbing) medicine to the nerve.    The InfuBLOCK pain pump will continuously deliver a local anesthetic medication to block the pain in the area of your  procedure.    Where can I find information about my pain pump?           For more information about your pain pump, scan the QR code.  For additional patient resources, visit [email or web address].                                                                                             The [email or web address] Nursing Hotline is Here for You 24/7.     Call [phone] for Assistance.  While your physician is your primary source for information about your treatment., there may be times during your treatment that you need assistance with your infusion pump. Our team of compassionate and knowledgeable Registered Nursed (RN) is here to assist every step of the way.    Answers to questions about your infusion pump                 Tubing disconnect  Assistance with pump alarms                                                      Dislodged catheter  Excessive leakage noted from pump                                         Inadequate pain control

## 2023-05-19 NOTE — NURSING NOTE
Dr. Borrego spoke with patient this AM and confirmed that they wished to have their explanted hardware from surgery on 5-18-23 returned to them.  MD retrieved explanted hardware from  and planned to return to patient.  Chain of Evidence form sent.

## 2023-05-19 NOTE — DISCHARGE INSTR - ACTIVITY
Dressing to remain in place until follow up  Non weight bearing to right upper extremity, Range of motion to elbow, wrist, and hand

## 2023-05-19 NOTE — PLAN OF CARE
Goal Outcome Evaluation:  Plan of Care Reviewed With: patient           Outcome Evaluation: Patient presents s/p R shoulder revision with mild balance deficits. She ambulated 430' CGA in [place] initially unsupported with mild unsteadiness showing improved steadiness with addition of SPC. IPPT is indicated to address current deficits, however she is cleared from a PT standpoint to D/C home with assist and use of SPC when medically appropriate.

## 2023-05-19 NOTE — CASE MANAGEMENT/SOCIAL WORK
Case Management Discharge Note      Final Note: Plan is home wit spouse. Spouse will transport. Patient denies any discharge needs and is in agreement with the plan.         Selected Continued Care - Admitted Since 5/18/2023     Destination    No services have been selected for the patient.              Durable Medical Equipment    No services have been selected for the patient.              Dialysis/Infusion    No services have been selected for the patient.              Home Medical Care    No services have been selected for the patient.              Therapy    No services have been selected for the patient.              Community Resources    No services have been selected for the patient.              Community & DME    No services have been selected for the patient.                       Final Discharge Disposition Code: 01 - home or self-care

## 2023-05-19 NOTE — PROGRESS NOTES
Patient seen at 945 am    Orthopedic Daily Progress Note      CC: R Revision reverse TSA    Pain well controlled  General: no fevers, chills  Abdomen: no nausea, vomiting, or diarrhea    No other complaints    Physical Exam:  I have reviewed the vital signs.  Temp:  [97 °F (36.1 °C)-98 °F (36.7 °C)] 97.9 °F (36.6 °C)  Heart Rate:  [66-82] 82  Resp:  [14-18] 18  BP: ()/(53-72) 95/59    Objective  General Appearance:    Alert, cooperative, no distress  Extremities: No clubbing, cyanosis, or edema to lower extremities  Pulses:  2+ in distal surgical extremity  Skin: Dressing Clean/dry/intact      Results Review:    I have reviewed the labs, radiology results and diagnostic studies:    Results from last 7 days   Lab Units 05/19/23  0426   WBC 10*3/mm3 10.65   HEMOGLOBIN g/dL 11.2*   PLATELETS 10*3/mm3 288     Results from last 7 days   Lab Units 05/19/23  0426   SODIUM mmol/L 131*   POTASSIUM mmol/L 4.2   CO2 mmol/L 24.0   CREATININE mg/dL 0.69   GLUCOSE mg/dL 108*       I have reviewed the medications.    Assessment/Problem List  POD# 1 Day Post-Op   S/p revision reverseTSA     Plan  Sling  Ot, elbow wrist and hand ROM  Dressing to remain place until fu        Discharge Planning: I expect patient to be discharged to home today if clears ot.    Jorge Borreog MD  05/19/23  11:36 EDT

## 2023-05-19 NOTE — PROGRESS NOTES
[facility]    Acute pain service Inpatient Progress Note    Patient Name: Jeanne Love  :  1950  MRN:  [id number]        Acute Pain  Service Inpatient Progress Note:    Analgesia:Good  Pain Score:0/10  LOC: alert and awake  Resp Status: room air  Cardiac: VS stable  Side Effects:None  Catheter Site:clean, dressing intact and dry  Cath type: peripheral nerve cath with ON Q  Volume: 1mL,5ml, 5ml InfuSystem Pump.  Catheter Plan:Catheter to remain Insitu and Continue catheter infusion rate unchanged  Comments: The neuro assessment of the operative extremity includes the ability to do finger flexion and extension; includes the ability to do wrist flexion and extension, includes the ability to do elbow flexion and extension.  The neuro exam of the patient includes sensory function throughout the operative extremity.

## 2023-05-20 LAB — BACTERIA SPEC AEROBE CULT: NO GROWTH

## 2023-05-21 LAB
BACTERIA SPEC AEROBE CULT: NORMAL
BACTERIA SPEC AEROBE CULT: NORMAL
BACTERIA SPEC ANAEROBE CULT: NORMAL
BACTERIA SPEC ANAEROBE CULT: NORMAL
GRAM STN SPEC: NORMAL
GRAM STN SPEC: NORMAL

## 2023-05-25 LAB
FUNGUS WND CULT: NORMAL
FUNGUS WND CULT: NORMAL
MYCOBACTERIUM SPEC CULT: NORMAL
MYCOBACTERIUM SPEC CULT: NORMAL
NIGHT BLUE STAIN TISS: NORMAL
NIGHT BLUE STAIN TISS: NORMAL

## 2023-05-26 ENCOUNTER — NURSE TRIAGE (OUTPATIENT)
Dept: CALL CENTER | Facility: HOSPITAL | Age: 73
End: 2023-05-26
Payer: MEDICARE

## 2023-05-26 NOTE — TELEPHONE ENCOUNTER
Had shoulder replacement on 05/18 and had wound vac time is up, it has shut off but my appt.  Is not till next week 05/31, what to do have tried to call my Dr. No answer. Called  On call for her connected with answering service. Left message for provider on call to call her back at [phone], after speaking  to nurse on floor of 3H .

## 2023-05-26 NOTE — TELEPHONE ENCOUNTER
"Reason for Disposition   [1] Follow-up call to recent contact AND [2] information only call, no triage required    Additional Information   Negative: [1] Caller is not with the adult (patient) AND [2] reporting urgent symptoms   Negative: Lab result questions   Negative: Medication questions   Negative: Caller can't be reached by phone   Negative: Caller has already spoken to PCP or another triager   Negative: RN needs further essential information from caller in order to complete triage   Negative: Requesting regular office appointment   Negative: [1] Caller requesting NON-URGENT health information AND [2] PCP's office is the best resource   Negative: Health Information question, no triage required and triager able to answer question   Negative: General information question, no triage required and triager able to answer question   Negative: Question about upcoming scheduled test, no triage required and triager able to answer question   Negative: [1] Caller is not with the adult (patient) AND [2] probable NON-URGENT symptoms    Answer Assessment - Initial Assessment Questions  1. REASON FOR CALL or QUESTION: \"What is your reason for calling today?\" or \"How can I best help you?\" or \"What question do you have that I can help answer?\"      What to do with wound vac time is up it is off    Protocols used: Information Only Call-ADULT-    "

## 2023-05-28 LAB
BACTERIA SPEC ANAEROBE CULT: NORMAL
BACTERIA SPEC ANAEROBE CULT: NORMAL

## 2024-08-13 ENCOUNTER — TRANSCRIBE ORDERS (OUTPATIENT)
Dept: ADMINISTRATIVE | Facility: HOSPITAL | Age: 74
End: 2024-08-13
Payer: MEDICARE

## 2024-08-13 DIAGNOSIS — Z12.31 ENCOUNTER FOR SCREENING MAMMOGRAM FOR MALIGNANT NEOPLASM OF BREAST: Primary | ICD-10-CM

## 2024-08-21 ENCOUNTER — TRANSCRIBE ORDERS (OUTPATIENT)
Dept: ADMINISTRATIVE | Facility: HOSPITAL | Age: 74
End: 2024-08-21
Payer: MEDICARE

## 2024-08-21 DIAGNOSIS — Z12.31 ENCOUNTER FOR SCREENING MAMMOGRAM FOR MALIGNANT NEOPLASM OF BREAST: Primary | ICD-10-CM

## 2024-08-22 ENCOUNTER — TRANSCRIBE ORDERS (OUTPATIENT)
Dept: ADMINISTRATIVE | Facility: HOSPITAL | Age: 74
End: 2024-08-22
Payer: MEDICARE

## 2024-08-22 DIAGNOSIS — R92.8 OTHER ABNORMAL AND INCONCLUSIVE FINDINGS ON DIAGNOSTIC IMAGING OF BREAST: Primary | ICD-10-CM

## 2024-09-16 LAB
NCCN CRITERIA FLAG: NORMAL
TYRER CUZICK SCORE: 3

## 2024-09-20 ENCOUNTER — HOSPITAL ENCOUNTER (OUTPATIENT)
Facility: HOSPITAL | Age: 74
Discharge: HOME | End: 2024-09-20
Payer: MEDICARE

## 2024-09-20 DIAGNOSIS — R92.8 OTHER ABNORMAL AND INCONCLUSIVE FINDINGS ON DIAGNOSTIC IMAGING OF BREAST: ICD-10-CM

## 2024-09-20 PROCEDURE — G0279 TOMOSYNTHESIS, MAMMO: HCPCS

## 2024-09-20 PROCEDURE — 77066 DX MAMMO INCL CAD BI: CPT

## 2024-09-20 PROCEDURE — 76642 ULTRASOUND BREAST LIMITED: CPT

## 2024-12-26 RX ORDER — SCOLOPAMINE TRANSDERMAL SYSTEM 1 MG/1
1 PATCH, EXTENDED RELEASE TRANSDERMAL CONTINUOUS
Status: CANCELLED | OUTPATIENT
Start: 2024-12-26 | End: 2024-12-29

## 2024-12-27 ENCOUNTER — HOSPITAL ENCOUNTER (OUTPATIENT)
Dept: GENERAL RADIOLOGY | Facility: HOSPITAL | Age: 74
Discharge: HOME | End: 2024-12-27
Payer: MEDICARE

## 2024-12-27 ENCOUNTER — PRE-ADMISSION TESTING (OUTPATIENT)
Dept: PREADMISSION TESTING | Facility: HOSPITAL | Age: 74
End: 2024-12-27
Payer: MEDICARE

## 2024-12-27 VITALS — WEIGHT: 145.83 LBS | BODY MASS INDEX: 20.88 KG/M2 | HEIGHT: 70 IN

## 2024-12-27 DIAGNOSIS — N81.10 VAGINAL PROLAPSE: ICD-10-CM

## 2024-12-27 DIAGNOSIS — I10 ESSENTIAL (PRIMARY) HYPERTENSION: ICD-10-CM

## 2024-12-27 LAB
ANION GAP SERPL CALCULATED.3IONS-SCNC: 10 MMOL/L (ref 5–15)
BUN SERPL-MCNC: 18 MG/DL (ref 8–23)
BUN/CREAT SERPL: 24.3 (ref 7–25)
CALCIUM SPEC-SCNC: 9.2 MG/DL (ref 8.6–10.5)
CHLORIDE SERPL-SCNC: 103 MMOL/L (ref 98–107)
CO2 SERPL-SCNC: 26 MMOL/L (ref 22–29)
CREAT SERPL-MCNC: 0.74 MG/DL (ref 0.57–1)
DEPRECATED RDW RBC AUTO: 47.4 FL (ref 37–54)
EGFRCR SERPLBLD CKD-EPI 2021: 85.6 ML/MIN/1.73
ERYTHROCYTE [DISTWIDTH] IN BLOOD BY AUTOMATED COUNT: 14.1 % (ref 12.3–15.4)
GLUCOSE SERPL-MCNC: 98 MG/DL (ref 65–99)
HCT VFR BLD AUTO: 39.1 % (ref 34–46.6)
HGB BLD-MCNC: 13.2 G/DL (ref 12–15.9)
MCH RBC QN AUTO: 30.6 PG (ref 26.6–33)
MCHC RBC AUTO-ENTMCNC: 33.8 G/DL (ref 31.5–35.7)
MCV RBC AUTO: 90.5 FL (ref 79–97)
PLATELET # BLD AUTO: 315 10*3/MM3 (ref 140–450)
PMV BLD AUTO: 8.9 FL (ref 6–12)
POTASSIUM SERPL-SCNC: 4.9 MMOL/L (ref 3.5–5.2)
QT INTERVAL: 400 MS
QTC INTERVAL: 458 MS
RBC # BLD AUTO: 4.32 10*6/MM3 (ref 3.77–5.28)
SODIUM SERPL-SCNC: 139 MMOL/L (ref 136–145)
WBC NRBC COR # BLD AUTO: 6.87 10*3/MM3 (ref 3.4–10.8)

## 2024-12-27 PROCEDURE — 80048 BASIC METABOLIC PNL TOTAL CA: CPT

## 2024-12-27 PROCEDURE — 36415 COLL VENOUS BLD VENIPUNCTURE: CPT

## 2024-12-27 PROCEDURE — 85027 COMPLETE CBC AUTOMATED: CPT

## 2024-12-27 PROCEDURE — 71046 X-RAY EXAM CHEST 2 VIEWS: CPT

## 2024-12-27 PROCEDURE — 93005 ELECTROCARDIOGRAM TRACING: CPT

## 2024-12-27 RX ORDER — BETHANECHOL CHLORIDE 5 MG
5 TABLET ORAL 3 TIMES DAILY
COMMUNITY

## 2024-12-27 NOTE — PAT
Patient did not review general PAT education video as instructed in their preoperative information received from their surgeon.  One-on-one Pre Admission Testing general education provided during PAT visit.  Copies of PAT general education handouts (Incentive Spirometry, Meds to Beds Program, Patient Belongings, Pre-op skin preparation instructions, Blood Glucose testing, Visitor policy, Surgery FAQ, Code H) distributed to patient. Encouraged patient/family to read PAT general education handouts thoroughly and notify PAT staff with any questions or concerns. Patient instructed to bring PAT pass and completed skin prep sheet (if applicable) on the day of procedure. Patient verbalized understanding of all information and priority content.     Patient to apply Chlorhexadine wipes  to surgical area (as instructed) the night before procedure and the AM of procedure. Wipes provided.    Patient instructed to drink 20 ounces of Gatorade or Gatorlyte (if diabetic) and it needs to be completed 1 hour (for Main OR patients) or 2 hours (scheduled  section & [facility] patients) before given arrival time for procedure (NO RED Gatorade and NO Gatorade Zero).    Patient verbalized understanding.    EKG CLEARED BY DR. HOOK.  PT DENIES CHEST PAIN OR SOB.

## 2024-12-31 ENCOUNTER — HOSPITAL ENCOUNTER (OUTPATIENT)
Facility: HOSPITAL | Age: 74
Discharge: HOME | End: 2025-01-01
Attending: UROLOGY | Admitting: UROLOGY
Payer: MEDICARE

## 2024-12-31 ENCOUNTER — ANESTHESIA (OUTPATIENT)
Dept: PERIOP | Facility: HOSPITAL | Age: 74
End: 2024-12-31
Payer: MEDICARE

## 2024-12-31 ENCOUNTER — ANESTHESIA EVENT CONVERTED (OUTPATIENT)
Dept: ANESTHESIOLOGY | Facility: HOSPITAL | Age: 74
End: 2024-12-31
Payer: MEDICARE

## 2024-12-31 ENCOUNTER — ANESTHESIA EVENT (OUTPATIENT)
Dept: PERIOP | Facility: HOSPITAL | Age: 74
End: 2024-12-31
Payer: MEDICARE

## 2024-12-31 DIAGNOSIS — N81.9 FEMALE GENITAL PROLAPSE, UNSPECIFIED TYPE: ICD-10-CM

## 2024-12-31 DIAGNOSIS — N81.10 VAGINAL PROLAPSE: Primary | ICD-10-CM

## 2024-12-31 DIAGNOSIS — I10 ESSENTIAL (PRIMARY) HYPERTENSION: ICD-10-CM

## 2024-12-31 PROBLEM — Z98.890 S/P SACROCOLPOPEXY: Status: ACTIVE | Noted: 2024-12-31

## 2024-12-31 PROCEDURE — 25010000002 CEFOXITIN PER 1 G: Performed by: UROLOGY

## 2024-12-31 PROCEDURE — 25010000002 LIDOCAINE PF 1% 1 % SOLUTION: Performed by: NURSE ANESTHETIST, CERTIFIED REGISTERED

## 2024-12-31 PROCEDURE — 25010000002 PROPOFOL 10 MG/ML EMULSION: Performed by: NURSE ANESTHETIST, CERTIFIED REGISTERED

## 2024-12-31 PROCEDURE — C1763 CONN TISS, NON-HUMAN: HCPCS | Performed by: UROLOGY

## 2024-12-31 PROCEDURE — 25810000003 LACTATED RINGERS PER 1000 ML: Performed by: NURSE ANESTHETIST, CERTIFIED REGISTERED

## 2024-12-31 PROCEDURE — 25010000002 DEXAMETHASONE PER 1 MG: Performed by: NURSE ANESTHETIST, CERTIFIED REGISTERED

## 2024-12-31 PROCEDURE — 25010000002 SUGAMMADEX 200 MG/2ML SOLUTION: Performed by: NURSE ANESTHETIST, CERTIFIED REGISTERED

## 2024-12-31 PROCEDURE — C1771 REP DEV, URINARY, W/SLING: HCPCS | Performed by: UROLOGY

## 2024-12-31 PROCEDURE — 25010000002 FENTANYL CITRATE (PF) 100 MCG/2ML SOLUTION: Performed by: NURSE ANESTHETIST, CERTIFIED REGISTERED

## 2024-12-31 PROCEDURE — 25010000002 LIDOCAINE PF 1% 1 % SOLUTION: Performed by: ANESTHESIOLOGY

## 2024-12-31 PROCEDURE — 25010000002 DEXAMETHASONE SODIUM PHOSPHATE 10 MG/ML SOLUTION: Performed by: NURSE ANESTHETIST, CERTIFIED REGISTERED

## 2024-12-31 PROCEDURE — 25010000002 SODIUM CHLORIDE 0.9 % WITH KCL 20 MEQ 20-0.9 MEQ/L-% SOLUTION: Performed by: UROLOGY

## 2024-12-31 PROCEDURE — 25010000002 ONDANSETRON PER 1 MG: Performed by: NURSE ANESTHETIST, CERTIFIED REGISTERED

## 2024-12-31 PROCEDURE — 25810000003 LACTATED RINGERS PER 1000 ML: Performed by: ANESTHESIOLOGY

## 2024-12-31 PROCEDURE — 25010000002 BUPIVACAINE (PF) 0.25 % SOLUTION: Performed by: NURSE ANESTHETIST, CERTIFIED REGISTERED

## 2024-12-31 DEVICE — TRADITIONAL Y MESH KIT
Type: IMPLANTABLE DEVICE | Site: PELVIS | Status: FUNCTIONAL
Brand: UPSYLON™ Y MESH KIT

## 2024-12-31 DEVICE — DEV CONTRL TISS STRATAFIX SPIRAL MNCRYL PLS SH 2/0 15CM UD: Type: IMPLANTABLE DEVICE | Site: PELVIS | Status: FUNCTIONAL

## 2024-12-31 DEVICE — TRANSOBTURATOR SLING SYSTEM WITH PRECISIONBLUE™ DESIGN
Type: IMPLANTABLE DEVICE | Site: PELVIS | Status: FUNCTIONAL
Brand: OBTRYX™ II SYSTEM - HALO

## 2024-12-31 DEVICE — TRADITIONAL Y MESH
Type: IMPLANTABLE DEVICE | Site: PELVIS | Status: FUNCTIONAL
Brand: UPSYLON™

## 2024-12-31 RX ORDER — FENTANYL CITRATE 50 UG/ML
50 INJECTION, SOLUTION INTRAMUSCULAR; INTRAVENOUS
Status: DISCONTINUED | OUTPATIENT
Start: 2024-12-31 | End: 2024-12-31 | Stop reason: HOSPADM

## 2024-12-31 RX ORDER — MEDROXYPROGESTERONE ACETATE 5 MG
5 TABLET ORAL DAILY
COMMUNITY

## 2024-12-31 RX ORDER — ACETAMINOPHEN 500 MG
1000 TABLET ORAL ONCE
Status: COMPLETED | OUTPATIENT
Start: 2024-12-31 | End: 2024-12-31

## 2024-12-31 RX ORDER — DOXYCYCLINE 100 MG/1
100 CAPSULE ORAL DAILY
Qty: 7 CAPSULE | Refills: 0 | Status: SHIPPED | OUTPATIENT
Start: 2024-12-31 | End: 2025-01-07

## 2024-12-31 RX ORDER — MELOXICAM 15 MG/1
15 TABLET ORAL ONCE
Status: COMPLETED | OUTPATIENT
Start: 2024-12-31 | End: 2024-12-31

## 2024-12-31 RX ORDER — ENOXAPARIN SODIUM 100 MG/ML
40 INJECTION SUBCUTANEOUS DAILY
Status: DISCONTINUED | OUTPATIENT
Start: 2025-01-01 | End: 2025-01-01 | Stop reason: HOSPADM

## 2024-12-31 RX ORDER — GABAPENTIN 300 MG/1
600 CAPSULE ORAL ONCE
Status: COMPLETED | OUTPATIENT
Start: 2024-12-31 | End: 2024-12-31

## 2024-12-31 RX ORDER — DOCUSATE SODIUM 100 MG/1
100 CAPSULE, LIQUID FILLED ORAL 2 TIMES DAILY PRN
Status: DISCONTINUED | OUTPATIENT
Start: 2024-12-31 | End: 2025-01-01 | Stop reason: HOSPADM

## 2024-12-31 RX ORDER — DOCUSATE SODIUM 100 MG/1
100 CAPSULE, LIQUID FILLED ORAL DAILY PRN
Qty: 30 CAPSULE | Refills: 1 | Status: SHIPPED | OUTPATIENT
Start: 2024-12-31 | End: 2025-12-31

## 2024-12-31 RX ORDER — LIDOCAINE HYDROCHLORIDE 10 MG/ML
0.5 INJECTION, SOLUTION EPIDURAL; INFILTRATION; INTRACAUDAL; PERINEURAL ONCE AS NEEDED
Status: COMPLETED | OUTPATIENT
Start: 2024-12-31 | End: 2024-12-31

## 2024-12-31 RX ORDER — BUPIVACAINE HYDROCHLORIDE AND EPINEPHRINE 5; 5 MG/ML; UG/ML
INJECTION, SOLUTION PERINEURAL AS NEEDED
Status: DISCONTINUED | OUTPATIENT
Start: 2024-12-31 | End: 2024-12-31 | Stop reason: HOSPADM

## 2024-12-31 RX ORDER — OXYCODONE HYDROCHLORIDE 5 MG/1
5 TABLET ORAL EVERY 4 HOURS PRN
Status: DISCONTINUED | OUTPATIENT
Start: 2024-12-31 | End: 2025-01-01 | Stop reason: HOSPADM

## 2024-12-31 RX ORDER — BETHANECHOL CHLORIDE 10 MG/1
5 TABLET ORAL 3 TIMES DAILY
Status: DISCONTINUED | OUTPATIENT
Start: 2024-12-31 | End: 2025-01-01 | Stop reason: HOSPADM

## 2024-12-31 RX ORDER — LIDOCAINE HYDROCHLORIDE 10 MG/ML
INJECTION, SOLUTION EPIDURAL; INFILTRATION; INTRACAUDAL; PERINEURAL AS NEEDED
Status: DISCONTINUED | OUTPATIENT
Start: 2024-12-31 | End: 2024-12-31 | Stop reason: SURG

## 2024-12-31 RX ORDER — BUPIVACAINE HYDROCHLORIDE 2.5 MG/ML
INJECTION, SOLUTION EPIDURAL; INFILTRATION; INTRACAUDAL
Status: COMPLETED | OUTPATIENT
Start: 2024-12-31 | End: 2024-12-31

## 2024-12-31 RX ORDER — ONDANSETRON 2 MG/ML
4 INJECTION INTRAMUSCULAR; INTRAVENOUS EVERY 6 HOURS PRN
Status: DISCONTINUED | OUTPATIENT
Start: 2024-12-31 | End: 2025-01-01 | Stop reason: HOSPADM

## 2024-12-31 RX ORDER — SODIUM CHLORIDE, SODIUM LACTATE, POTASSIUM CHLORIDE, CALCIUM CHLORIDE 600; 310; 30; 20 MG/100ML; MG/100ML; MG/100ML; MG/100ML
9 INJECTION, SOLUTION INTRAVENOUS CONTINUOUS
Status: ACTIVE | OUTPATIENT
Start: 2025-01-01 | End: 2025-01-01

## 2024-12-31 RX ORDER — ACETAMINOPHEN 650 MG/1
650 SUPPOSITORY RECTAL EVERY 6 HOURS
Status: DISCONTINUED | OUTPATIENT
Start: 2024-12-31 | End: 2025-01-01 | Stop reason: HOSPADM

## 2024-12-31 RX ORDER — ACETAMINOPHEN 160 MG/5ML
650 SOLUTION ORAL EVERY 6 HOURS
Status: DISCONTINUED | OUTPATIENT
Start: 2024-12-31 | End: 2025-01-01 | Stop reason: HOSPADM

## 2024-12-31 RX ORDER — LABETALOL HYDROCHLORIDE 5 MG/ML
10 INJECTION, SOLUTION INTRAVENOUS EVERY 4 HOURS PRN
Status: DISCONTINUED | OUTPATIENT
Start: 2024-12-31 | End: 2025-01-01 | Stop reason: HOSPADM

## 2024-12-31 RX ORDER — ONDANSETRON 4 MG/1
4 TABLET, ORALLY DISINTEGRATING ORAL EVERY 6 HOURS PRN
Status: DISCONTINUED | OUTPATIENT
Start: 2024-12-31 | End: 2025-01-01 | Stop reason: HOSPADM

## 2024-12-31 RX ORDER — SODIUM CHLORIDE AND POTASSIUM CHLORIDE 150; 900 MG/100ML; MG/100ML
100 INJECTION, SOLUTION INTRAVENOUS CONTINUOUS
Status: DISPENSED | OUTPATIENT
Start: 2024-12-31 | End: 2025-01-01

## 2024-12-31 RX ORDER — EPHEDRINE SULFATE 50 MG/ML
INJECTION INTRAVENOUS AS NEEDED
Status: DISCONTINUED | OUTPATIENT
Start: 2024-12-31 | End: 2024-12-31 | Stop reason: SURG

## 2024-12-31 RX ORDER — SODIUM CHLORIDE, SODIUM LACTATE, POTASSIUM CHLORIDE, CALCIUM CHLORIDE 600; 310; 30; 20 MG/100ML; MG/100ML; MG/100ML; MG/100ML
INJECTION, SOLUTION INTRAVENOUS CONTINUOUS PRN
Status: DISCONTINUED | OUTPATIENT
Start: 2024-12-31 | End: 2024-12-31 | Stop reason: SURG

## 2024-12-31 RX ORDER — NALOXONE HCL 0.4 MG/ML
0.1 VIAL (ML) INJECTION
Status: DISCONTINUED | OUTPATIENT
Start: 2024-12-31 | End: 2025-01-01 | Stop reason: HOSPADM

## 2024-12-31 RX ORDER — FENTANYL CITRATE 50 UG/ML
INJECTION, SOLUTION INTRAMUSCULAR; INTRAVENOUS AS NEEDED
Status: DISCONTINUED | OUTPATIENT
Start: 2024-12-31 | End: 2024-12-31 | Stop reason: SDUPTHER

## 2024-12-31 RX ORDER — MIDAZOLAM HYDROCHLORIDE 1 MG/ML
0.5 INJECTION, SOLUTION INTRAMUSCULAR; INTRAVENOUS
Status: DISCONTINUED | OUTPATIENT
Start: 2024-12-31 | End: 2024-12-31 | Stop reason: HOSPADM

## 2024-12-31 RX ORDER — GABAPENTIN 100 MG/1
100 CAPSULE ORAL 3 TIMES DAILY
Status: DISCONTINUED | OUTPATIENT
Start: 2024-12-31 | End: 2025-01-01 | Stop reason: HOSPADM

## 2024-12-31 RX ORDER — SODIUM CHLORIDE 0.9 % (FLUSH) 0.9 %
10 SYRINGE (ML) INJECTION AS NEEDED
Status: DISCONTINUED | OUTPATIENT
Start: 2024-12-31 | End: 2024-12-31 | Stop reason: HOSPADM

## 2024-12-31 RX ORDER — ONDANSETRON 2 MG/ML
4 INJECTION INTRAMUSCULAR; INTRAVENOUS ONCE AS NEEDED
Status: DISCONTINUED | OUTPATIENT
Start: 2024-12-31 | End: 2024-12-31 | Stop reason: HOSPADM

## 2024-12-31 RX ORDER — PROPOFOL 10 MG/ML
VIAL (ML) INTRAVENOUS AS NEEDED
Status: DISCONTINUED | OUTPATIENT
Start: 2024-12-31 | End: 2024-12-31 | Stop reason: SURG

## 2024-12-31 RX ORDER — ESTRADIOL 0.5 MG/1
0.5 TABLET ORAL 2 TIMES WEEKLY
COMMUNITY

## 2024-12-31 RX ORDER — ROCURONIUM BROMIDE 10 MG/ML
INJECTION, SOLUTION INTRAVENOUS AS NEEDED
Status: DISCONTINUED | OUTPATIENT
Start: 2024-12-31 | End: 2024-12-31 | Stop reason: SURG

## 2024-12-31 RX ORDER — LIDOCAINE HYDROCHLORIDE 20 MG/ML
JELLY TOPICAL AS NEEDED
Status: DISCONTINUED | OUTPATIENT
Start: 2024-12-31 | End: 2024-12-31 | Stop reason: HOSPADM

## 2024-12-31 RX ORDER — HYDROMORPHONE HYDROCHLORIDE 1 MG/ML
0.5 INJECTION, SOLUTION INTRAMUSCULAR; INTRAVENOUS; SUBCUTANEOUS
Status: DISCONTINUED | OUTPATIENT
Start: 2024-12-31 | End: 2024-12-31 | Stop reason: HOSPADM

## 2024-12-31 RX ORDER — DEXAMETHASONE SODIUM PHOSPHATE 10 MG/ML
INJECTION, SOLUTION INTRAMUSCULAR; INTRAVENOUS
Status: COMPLETED | OUTPATIENT
Start: 2024-12-31 | End: 2024-12-31

## 2024-12-31 RX ORDER — HYDROCODONE BITARTRATE AND ACETAMINOPHEN 7.5; 325 MG/1; MG/1
1 TABLET ORAL EVERY 6 HOURS PRN
Qty: 30 TABLET | Refills: 0 | Status: SHIPPED | OUTPATIENT
Start: 2024-12-31

## 2024-12-31 RX ORDER — FAMOTIDINE 10 MG/ML
20 INJECTION, SOLUTION INTRAVENOUS ONCE
Status: DISCONTINUED | OUTPATIENT
Start: 2024-12-31 | End: 2024-12-31 | Stop reason: HOSPADM

## 2024-12-31 RX ORDER — ACETAMINOPHEN 325 MG/1
650 TABLET ORAL EVERY 6 HOURS
Status: DISCONTINUED | OUTPATIENT
Start: 2024-12-31 | End: 2025-01-01 | Stop reason: HOSPADM

## 2024-12-31 RX ORDER — DEXAMETHASONE SODIUM PHOSPHATE 4 MG/ML
INJECTION, SOLUTION INTRA-ARTICULAR; INTRALESIONAL; INTRAMUSCULAR; INTRAVENOUS; SOFT TISSUE AS NEEDED
Status: DISCONTINUED | OUTPATIENT
Start: 2024-12-31 | End: 2024-12-31 | Stop reason: SURG

## 2024-12-31 RX ORDER — FAMOTIDINE 20 MG/1
20 TABLET, FILM COATED ORAL ONCE
Status: COMPLETED | OUTPATIENT
Start: 2024-12-31 | End: 2024-12-31

## 2024-12-31 RX ORDER — PANTOPRAZOLE SODIUM 40 MG/1
40 TABLET, DELAYED RELEASE ORAL
Status: DISCONTINUED | OUTPATIENT
Start: 2025-01-01 | End: 2025-01-01 | Stop reason: HOSPADM

## 2024-12-31 RX ORDER — HYDROMORPHONE HYDROCHLORIDE 1 MG/ML
0.5 INJECTION, SOLUTION INTRAMUSCULAR; INTRAVENOUS; SUBCUTANEOUS
Status: DISCONTINUED | OUTPATIENT
Start: 2024-12-31 | End: 2025-01-01 | Stop reason: HOSPADM

## 2024-12-31 RX ORDER — ONDANSETRON 2 MG/ML
INJECTION INTRAMUSCULAR; INTRAVENOUS AS NEEDED
Status: DISCONTINUED | OUTPATIENT
Start: 2024-12-31 | End: 2024-12-31 | Stop reason: SDUPTHER

## 2024-12-31 RX ORDER — CETIRIZINE HYDROCHLORIDE 10 MG/1
10 TABLET ORAL DAILY PRN
Status: DISCONTINUED | OUTPATIENT
Start: 2024-12-31 | End: 2025-01-01 | Stop reason: HOSPADM

## 2024-12-31 RX ORDER — SODIUM CHLORIDE 0.9 % (FLUSH) 0.9 %
10 SYRINGE (ML) INJECTION EVERY 12 HOURS SCHEDULED
Status: DISCONTINUED | OUTPATIENT
Start: 2024-12-31 | End: 2024-12-31 | Stop reason: HOSPADM

## 2024-12-31 RX ADMIN — MELOXICAM 15 MG: 15 TABLET ORAL at 08:31

## 2024-12-31 RX ADMIN — ROCURONIUM BROMIDE 50 MG: 10 INJECTION INTRAVENOUS at 09:14

## 2024-12-31 RX ADMIN — EPHEDRINE SULFATE 10 MG: 50 INJECTION INTRAVENOUS at 10:43

## 2024-12-31 RX ADMIN — DEXAMETHASONE SODIUM PHOSPHATE 4 MG: 4 INJECTION INTRA-ARTICULAR; INTRALESIONAL; INTRAMUSCULAR; INTRAVENOUS; SOFT TISSUE at 09:17

## 2024-12-31 RX ADMIN — CEFOXITIN SODIUM 2000 MG: 2 POWDER, FOR SOLUTION INTRAVENOUS at 09:20

## 2024-12-31 RX ADMIN — DEXAMETHASONE SODIUM PHOSPHATE 4 MG: 10 INJECTION INTRAMUSCULAR; INTRAVENOUS at 09:15

## 2024-12-31 RX ADMIN — GABAPENTIN 100 MG: 100 CAPSULE ORAL at 15:38

## 2024-12-31 RX ADMIN — ACETAMINOPHEN 650 MG: 325 TABLET ORAL at 20:37

## 2024-12-31 RX ADMIN — ONDANSETRON 4 MG: 2 INJECTION INTRAMUSCULAR; INTRAVENOUS at 10:27

## 2024-12-31 RX ADMIN — PROPOFOL 200 MG: 10 INJECTION, EMULSION INTRAVENOUS at 09:14

## 2024-12-31 RX ADMIN — BETHANECHOL CHLORIDE 5 MG: 10 TABLET ORAL at 17:10

## 2024-12-31 RX ADMIN — ROCURONIUM BROMIDE 20 MG: 10 INJECTION INTRAVENOUS at 09:55

## 2024-12-31 RX ADMIN — FENTANYL CITRATE 100 MCG: 50 INJECTION, SOLUTION INTRAMUSCULAR; INTRAVENOUS at 09:14

## 2024-12-31 RX ADMIN — CEFOXITIN 1000 MG: 1 INJECTION, POWDER, FOR SOLUTION INTRAVENOUS at 16:38

## 2024-12-31 RX ADMIN — POTASSIUM CHLORIDE AND SODIUM CHLORIDE 100 ML/HR: 900; 150 INJECTION, SOLUTION INTRAVENOUS at 15:17

## 2024-12-31 RX ADMIN — SODIUM CHLORIDE, POTASSIUM CHLORIDE, SODIUM LACTATE AND CALCIUM CHLORIDE 9 ML/HR: 600; 310; 30; 20 INJECTION, SOLUTION INTRAVENOUS at 08:10

## 2024-12-31 RX ADMIN — LIDOCAINE HYDROCHLORIDE 0.5 ML: 10 INJECTION, SOLUTION EPIDURAL; INFILTRATION; INTRACAUDAL; PERINEURAL at 08:10

## 2024-12-31 RX ADMIN — SUGAMMADEX 200 MG: 100 INJECTION, SOLUTION INTRAVENOUS at 10:48

## 2024-12-31 RX ADMIN — FAMOTIDINE 20 MG: 20 TABLET, FILM COATED ORAL at 08:31

## 2024-12-31 RX ADMIN — BETHANECHOL CHLORIDE 5 MG: 10 TABLET ORAL at 20:37

## 2024-12-31 RX ADMIN — GABAPENTIN 100 MG: 100 CAPSULE ORAL at 20:37

## 2024-12-31 RX ADMIN — PROPOFOL 25 MCG/KG/MIN: 10 INJECTION, EMULSION INTRAVENOUS at 09:30

## 2024-12-31 RX ADMIN — ACETAMINOPHEN 1000 MG: 500 TABLET, FILM COATED ORAL at 08:31

## 2024-12-31 RX ADMIN — GABAPENTIN 600 MG: 300 CAPSULE ORAL at 08:31

## 2024-12-31 RX ADMIN — LIDOCAINE HYDROCHLORIDE 50 MG: 10 INJECTION, SOLUTION EPIDURAL; INFILTRATION; INTRACAUDAL; PERINEURAL at 09:14

## 2024-12-31 RX ADMIN — SODIUM CHLORIDE, POTASSIUM CHLORIDE, SODIUM LACTATE AND CALCIUM CHLORIDE: 600; 310; 30; 20 INJECTION, SOLUTION INTRAVENOUS at 09:10

## 2024-12-31 RX ADMIN — BUPIVACAINE HYDROCHLORIDE 60 ML: 2.5 INJECTION, SOLUTION EPIDURAL; INFILTRATION; INTRACAUDAL; PERINEURAL at 09:15

## 2024-12-31 RX ADMIN — ACETAMINOPHEN 650 MG: 325 TABLET ORAL at 15:38

## 2024-12-31 NOTE — ANESTHESIA POSTPROCEDURE EVALUATION
Patient: Jeanne Love    Procedure Summary       Date: 12/31/24 [facility] / [facility]  [facility]    Anesthesia Start: 0910 Anesthesia Stop: 1057    Procedure: ROBOTIC SACROCOLPOPEXY , TRANSOBTURATOR TAPE (TOT) (Pelvis) Diagnosis:     Surgeons: Sean Caceres Jr., MD Provider: Herbert Dominguez MD    Anesthesia Type: general with block ASA Status: 2            Anesthesia Type: general with block    Vitals  Vitals Value Taken Time   BP 97/52 12/31/24 1054   Temp     Pulse 75 12/31/24 1058   Resp     SpO2 99 % 12/31/24 1058   Vitals shown include unfiled device data.        Post Anesthesia Care and Evaluation    Patient location during evaluation: PACU  Patient participation: complete - patient participated  Level of consciousness: sleepy but conscious  Pain management: adequate    Airway patency: patent  Anesthetic complications: No anesthetic complications  PONV Status: none  Cardiovascular status: hemodynamically stable and acceptable  Respiratory status: nonlabored ventilation, acceptable and nasal cannula  Hydration status: acceptable

## 2024-12-31 NOTE — H&P
Admission      Patient Name: Jeanne Love  MRN: [id number]  : 1950  DOS: 2024    Attending: Sean Caceres Jr*    Primary Care Provider: Anuradha Llanos APRN      Patient Care Team:  Anuradha Llanos APRN as PCP - General (Nurse Practitioner)  Anuradha Llanos APRN as Referring Physician (Nurse Practitioner)    Chief complaint:  Uterovaginal prolapse   stress urinary incontinence    Subjective   Patient is a pleasant 74 y.o. female presented for scheduled surgery by Dr. Morris Galarza.   Per his note (This is a pleasant 74-year-old female with symptomatic uterovaginal prolapse and stress urinary incontinence with urethral hypermobility. She is opted for surgical therapy including robotic assisted laparoscopic sacrocolpopexy and suburethral sling with a transobturator approach. She understands risk benefits and alternatives and gives her full consent. She also understands the use of pelvic mesh and risks/benefits associated with this.).    She underwent robotic sacrocolpopexy with transobturator tape and cystoscopy.  Tolerated procedure well and was admitted for further management.    Seen in her room postop, doing well, good pain control, no complains of nausea, vomiting, or shortness of breath.    Patient is known to our practice from prior hospitalization for shoulder surgery.  She is generally healthy.     Allergies   Allergen Reactions    Tramadol Hallucinations          Medications Prior to Admission   Medication Sig Dispense Refill Last Dose/Taking    bethanechol (URECHOLINE) 5 MG tablet Take 1 tablet by mouth 3 (Three) Times a Day.   2024 Evening    cetirizine (zyrTEC) 10 MG tablet Take 1 tablet by mouth Daily As Needed for Allergies or Rhinitis.   2024 Morning    estradiol (ESTRACE) 0.5 MG tablet Take 1 tablet by mouth 2 (Two) Times a Week.   2024 Morning    medroxyPROGESTERone (PROVERA) 5 MG tablet Take 1 tablet by mouth  "Daily.   12/31/2024 Morning          Past Medical History:   Diagnosis Date    Arthritis     COVID     2020    Hx of thumb surgery     bilateral    Joint pain     PONV (postoperative nausea and vomiting)     Spondylolysis     LUMBAR    Wears glasses      Past Surgical History:   Procedure Laterality Date    COLONOSCOPY      HAND ARTHROPLASTY Bilateral     CMC JOINT ARTHROPLASTY    JOINT REPLACEMENT Right     KNEE    KNEE ARTHROSCOPY      REDUCTION MAMMAPLASTY Bilateral     Lift only    ROTATOR CUFF REPAIR      TEETH EXTRACTION      IMPLANTS    TOTAL SHOULDER ARTHROPLASTY W/ DISTAL CLAVICLE EXCISION Right 09/19/2016    Procedure: RIGHT TOTAL SHOULDER REVERSE ARTHROPLASTY, BICEPS TENODESIS;  Surgeon: Wilfrid Stanford MD;  Location:  [facility];  Service:     TOTAL SHOULDER REVISION Right 05/18/2023    Procedure: REVISION REVERSE TOTAL SHOULDER ARTHROPLASTY - RIGHT;  Surgeon: Jorge Borrego MD;  Location:  [facility];  Service: Orthopedics;  Laterality: Right;     Family History   Problem Relation Age of Onset    Breast cancer Neg Hx     Ovarian cancer Neg Hx      Social History     Tobacco Use    Smoking status: Former     Types: Cigarettes    Smokeless tobacco: Never    Tobacco comments:     Smoked as a teenager 2-3 years    Vaping Use    Vaping status: Never Used   Substance Use Topics    Alcohol use: No    Drug use: No       Review of Systems  Pertinent items are noted in HPI    Vital Signs  /79 (BP Location: Right arm, Patient Position: Lying)   Pulse 83   Temp 98.3 °F (36.8 °C) (Oral)   Resp 18   Ht 177.8 cm (70\")   Wt 65.8 kg (145 lb)   SpO2 96%   BMI 20.81 kg/m²     Physical Exam:    General Appearance:    Alert, cooperative, in no acute distress   Head:    Normocephalic, without obvious abnormality, atraumatic   Eyes:            Lids and lashes normal, conjunctivae and sclerae normal, no   icterus, no pallor, corneas clear   Ears:    Ears appear intact with no abnormalities noted   Throat: "   No oral lesions, no thrush, oral mucosa moist   Neck:   No adenopathy, supple, trachea midline, no thyromegaly         Lungs:     Clear to auscultation, respirations regular, even and       unlabored. No wheezes or rales.    Heart:    Regular rhythm and normal rate, normal S1 and S2, no murmur    Abdomen:      Soft and benign with clean incisions.     Genitalia:    Deferred  Lima: Clear urine   Extremities:   Moves all extremities well, no edema, no cyanosis, no              redness   Pulses:   Pulses palpable and equal bilaterally   Skin:   No bleeding, bruising or rash   Neurologic:   Cranial nerves 2 - 12 grossly intact, no gross motor deficit     Results from last 7 days   Lab Units 12/27/24  1428   WBC 10*3/mm3 6.87   HEMOGLOBIN g/dL 13.2   HEMATOCRIT % 39.1   PLATELETS 10*3/mm3 315     Results from last 7 days   Lab Units 12/27/24  1428   SODIUM mmol/L 139   POTASSIUM mmol/L 4.9   CHLORIDE mmol/L 103   CO2 mmol/L 26.0   BUN mg/dL 18   CREATININE mg/dL 0.74   CALCIUM mg/dL 9.2   GLUCOSE mg/dL 98     Lab Results   Component Value Date    HGBA1C 5.60 05/11/2023       Assessment and Plan:       S/P sacrocolpopexy, robotic, with transobturator tape, cystoscopy.    Pelvic prolapse      Plan  1. Ambulation, in AM.  2. Pain control-PRNs   3. IS-encourage  4. DVT proph- Mechanical, subcutaneous Lovenox  5. Bowel regimen  6. Resume home medications as appropriate  7. Monitor post-op labs  8. Discharge planning   9. Diet, Clears, advance diet as tolerated.  IVF initially, monitor volume status.    Voiding trial after Lima and packing removal in AM.    Brody disclaimer:  Part of this encounter note is an electronic transcription/translation of spoken language to printed text. The electronic translation of spoken language may permit erroneous, or at times, nonsensical words or phrases to be inadvertently transcribed; Although I have reviewed the note for such errors, some may still exist.    Ivory Baugh,  MD  12/31/24  16:57 EST

## 2024-12-31 NOTE — H&P
Pre-Op H&P  Jeanne Love  [id number]  1950      Chief complaint: Pelvic prolapse      Subjective:  Patient is a 74 y.o.female presents for scheduled surgery by Dr. Caceres. She anticipates a ROBOTIC SACROCOLPOPEXY , TRANSOBTURATOR TAPE  today. She reports urinary incontinence for many years and pelvic prolapse for about a year. She has history of recurrent UTI. She states since started on urecholine she has not had a UTI (about 6 months).      Review of Systems:  Constitutional-- No fever, chills or sweats. No fatigue.  CV-- No chest pain, palpitation or syncope  Resp-- No SOB, cough, hemoptysis  Skin--No rashes or lesions      Allergies:   Allergies   Allergen Reactions    Tramadol Hallucinations         Home Meds:  Medications Prior to Admission   Medication Sig Dispense Refill Last Dose/Taking    bethanechol (URECHOLINE) 5 MG tablet Take 1 tablet by mouth 3 (Three) Times a Day.       cetirizine (zyrTEC) 10 MG tablet Take 1 tablet by mouth Daily As Needed for Allergies or Rhinitis.            PMH:   Past Medical History:   Diagnosis Date    Arthritis     COVID     2020    Hx of thumb surgery     bilateral    Joint pain     PONV (postoperative nausea and vomiting)     Spondylolysis     LUMBAR    Wears glasses      PSH:    Past Surgical History:   Procedure Laterality Date    COLONOSCOPY      HAND ARTHROPLASTY Bilateral     CMC JOINT ARTHROPLASTY    JOINT REPLACEMENT Right     KNEE    KNEE ARTHROSCOPY      REDUCTION MAMMAPLASTY Bilateral     Lift only    ROTATOR CUFF REPAIR      TEETH EXTRACTION      IMPLANTS    TOTAL SHOULDER ARTHROPLASTY W/ DISTAL CLAVICLE EXCISION Right 09/19/2016    Procedure: RIGHT TOTAL SHOULDER REVERSE ARTHROPLASTY, BICEPS TENODESIS;  Surgeon: Wilfrid Stanford MD;  Location:  [facility];  Service:     TOTAL SHOULDER REVISION Right 05/18/2023    Procedure: REVISION REVERSE TOTAL SHOULDER ARTHROPLASTY - RIGHT;  Surgeon: Jorge Borrego MD;  Location:  [facility];  Service:  Orthopedics;  Laterality: Right;       Immunization History:  Influenza: No  Pneumococcal: No  Tetanus: No    Social History:   Tobacco:   Social History     Tobacco Use   Smoking Status Former    Types: Cigarettes   Smokeless Tobacco Never   Tobacco Comments    Smoked as a teenager 2-3 years       Alcohol:     Social History     Substance and Sexual Activity   Alcohol Use No         Physical Exam: VS: /65  HR 70  RR 16  T 97.0  Sat 100%RA      General Appearance:    Alert, cooperative, no distress, appears stated age   Head:    Normocephalic, without obvious abnormality, atraumatic   Lungs:     Clear to auscultation bilaterally, respirations unlabored    Heart:   Regular rate and rhythm, S1 and S2 normal    Abdomen:    Soft without tenderness   Extremities:   Extremities normal, atraumatic, no cyanosis or edema   Skin:   Skin color, texture, turgor normal, no rashes or lesions   Neurologic:   Grossly intact     Results Review:     LABS:  Lab Results   Component Value Date    WBC 6.87 12/27/2024    HGB 13.2 12/27/2024    HCT 39.1 12/27/2024    MCV 90.5 12/27/2024     12/27/2024    NEUTROABS 8.58 (H) 05/19/2023    GLUCOSE 98 12/27/2024    BUN 18 12/27/2024    CREATININE 0.74 12/27/2024    EGFRIFNONA 72 09/16/2016     12/27/2024    K 4.9 12/27/2024     12/27/2024    CO2 26.0 12/27/2024    CALCIUM 9.2 12/27/2024    ALBUMIN 4.1 05/11/2023    AST 28 05/11/2023    ALT 21 05/11/2023    BILITOT 0.2 05/11/2023       RADIOLOGY:  Imaging Results (Last 72 Hours)       ** No results found for the last 72 hours. **            I reviewed the patient's new clinical results.    Cancer Staging (if applicable)  Cancer Patient: __ yes __no __unknown; If yes, clinical stage T:__ N:__M:__, stage group or __N/A      Impression: Pelvic prolapse       Plan: ROBOTIC SACROCOLPOPEXY, TRANSOBTURATOR TAPE       PIERRE Salmeron   12/31/2024   08:08 EST

## 2024-12-31 NOTE — ANESTHESIA PROCEDURE NOTES
"Peripheral Block      Patient reassessed immediately prior to procedure    Patient location during procedure: OR  Reason for block: at surgeon's request and post-op pain management  Performed by  Anesthesiologist: Herbert Dominguez MD  Preanesthetic Checklist  Completed: patient identified, IV checked, site marked, risks and benefits discussed, surgical consent, monitors and equipment checked, pre-op evaluation and timeout performed  Prep:  Pt Position: supine  Sterile barriers:cap, gloves, mask and washed/disinfected hands  Prep: ChloraPrep  Patient monitoring: blood pressure monitoring, continuous pulse oximetry and EKG  Procedure    Sedation: yes  Performed under: general  Guidance:ultrasound guided  Images:still images obtained, printed/placed on chart    Laterality:Bilateral  Block Type:TAP  Injection Technique:single-shot  Needle Type:short-bevel and echogenic  Needle Gauge:20 G  Resistance on Injection: none    Medications Used: dexamethasone sodium phosphate injection - Injection   4 mg - 12/31/2024 9:15:00 AM  bupivacaine PF (MARCAINE) 0.25 % injection - Injection   60 mL - 12/31/2024 9:15:00 AM      Medications  Comment:Block Injection:  LA dose divided between Right and Left block        Post Assessment  Injection Assessment: negative aspiration for heme, incremental injection and no paresthesia on injection  Patient Tolerance:comfortable throughout block  Complications:no  Additional Notes    Subcostal TAPs    A high-frequency linear transducer, with sterile cover, was placed sub-xiphoid to identify Line[place] Alba, right and left Rectus Abdominus Muscles (ASTON). The transducer was moved either right or left subcostally to identify the ASTON and the Transverse Abdominus Muscle (MASON). The insertion site was prepped in sterile fashion and then localized with 2-5 ml of 1% Lidocaine. Using ultrasound-guidance, a 20-gauge B-Shelton 4\" Ultraplex 360 non-stimulating echogenic needle was advanced in plane, from medial to " lateral, until the tip of the needle was in the fascial plane between the ASTON and MASON. 1-3ml of preservative free normal saline was used to hydro-dissect the fascial planes. After the fascial plane was verified, the local anesthetic (LA) was injected. The procedure was repeated on the opposite side for bilateral coverage. Aspiration every 5 ml to prevent intravascular injection. Injection was completed with negative aspiration of blood and negative intravascular injection. Injection pressures were normal with minimal resistance. The subcostal approach to the TAP nerve block ideally anesthetizes the intercostal nerves T6-T9.    Performed by: Jack Bone CRNA

## 2024-12-31 NOTE — ANESTHESIA PREPROCEDURE EVALUATION
Anesthesia Evaluation     Patient summary reviewed and Nursing notes reviewed   history of anesthetic complications:  PONV  NPO Solid Status: > 8 hours  NPO Liquid Status: > 2 hours           Airway   Mallampati: II  TM distance: >3 FB  Neck ROM: full  No difficulty expected  Dental - normal exam     Pulmonary - negative pulmonary ROS and normal exam    breath sounds clear to auscultation  Cardiovascular - negative cardio ROS and normal exam    ECG reviewed  Rhythm: regular  Rate: normal        Neuro/Psych- negative ROS  GI/Hepatic/Renal/Endo - negative ROS     Musculoskeletal     Abdominal    Substance History      OB/GYN      Comment: Pelvic prolapse      Other   arthritis,                 Anesthesia Plan    ASA 2     general with block     (Bilateral TAP blocks post-induction for post-operative analgesia per request of Dr. Caceres  )  intravenous induction     Anesthetic plan, risks, benefits, and alternatives have been provided, discussed and informed consent has been obtained with: patient.    Plan discussed with CRNA.    CODE STATUS:

## 2024-12-31 NOTE — OP NOTE
SACROCOLPOPEXY LAPAROSCOPIC WITH DAVINCI ROBOT TRANSVAGINAL TAPING WITH OBTURATOR  Procedure Note    Jeanne STARR Kate  12/31/2024    Pre-op Diagnosis:   Uterovaginal prolapse   stress urinary incontinence      Post-op Diagnosis:     Uterovaginal prolapse  Stress urinary incontinence    Procedure/CPT® Codes:      Procedure(s):  ROBOTIC SACROCOLPOPEXY , TRANSOBTURATOR TAPE (TOT), cystoscopy    Surgeon(s):  Sean Caceres Jr., MD    Anesthesia: General with Block    Staff:   Circulator: Taisha Pate RN  Scrub Person: Gerardo Kelley Isabel R  Nursing Assistant: Ellen Walls  Assistant: Shar Christine PA-C  Orientee: Aniyah Strauss RNA    Assistant: Shar Christine PA-C Was needed to assist in this case with retraction, exposure, instrument placement.    Estimated Blood Loss: <500ml  Urine Voided: * No values recorded between 12/31/2024  9:10 AM and 12/31/2024 10:45 AM *    Specimens:                None      Drains:   Urethral Catheter Silicone 16 Fr. (Active)       Findings: Uterovaginal prolapse    Complications: None    History: This is a pleasant 74-year-old female with symptomatic uterovaginal prolapse and stress urinary incontinence with urethral hypermobility.  She is opted for surgical therapy including robotic assisted laparoscopic sacrocolpopexy and suburethral sling with a transobturator approach.  She understands risk benefits and alternatives and gives her full consent.  She also understands the use of pelvic mesh and risks/benefits associated with this.    Operative Report: After consent is obtained the patient is brought to the operating suite where she is placed in supine position.  Anesthesia was induced.  She is carefully placed in dorsolithotomy position padding all pressure points.  She was sterilely prepped and draped in normal fashion.  Veress needle technique is used at the umbilicus to create pneumoperitoneum.  8 mm blunt Visiport was then used to create a camera trocar  site at the umbilicus.  Remainder of the robotic and assistant trocars were placed under direct vision.  Patient's placed in steep unilateral position the robot is docked.  We reflected all of the bowel out of the pelvis and identified the sacral promontory.  We open the posterior peritoneum and dissected onto the anterior spinal ligament opening the posterior peritoneum longitudinally.  The right ureter was visualized and well away from our area of dissection.  At this point we identified the apex of the vagina and perform dissection in the vesicovaginal space.  This freed up the vagina for our mesh to be placed.  Y mesh was then fashioned and we secured this mesh to the anterior and posterior aspect of the vagina using interrupted Rock-Cesar suture.  Single arm of the mesh was then secured to the anterior spinal ligament again using interrupted Rock-Cesar suture.  Excess mesh was excised.  We excluded the mesh from the peritoneal cavity by closing the posterior peritoneum using STRATAFIX suture.  Again the right ureter was well away from the area of dissection.  At this point the robot was undocked and under laparoscopic guidance removed all for all of our trocars.  Incisions were irrigated and subcutaneous tissues brought together with 3-0 Vicryl.  Skin was brought together using Dermabond.  At this point we went down below where we noted a significant improvement in her pelvic relaxation and resultant resolution of her pelvic/utero vaginal prolapse.  We created a mid urethral colpotomy which was longitudinal after performing hydrodissection with Marcaine and epinephrine.  We dissected laterally through the colpotomy to the pubic rami.  2 stab incisions were then created and the labia majora and using helical passers we passed our suburethral mesh into place.  Copious amounts of irrigation were used.  Cystoscopy was performed at this time showing no sign of urethral or trigonal injury.  Bladder was normal.  Lima  catheter was replaced.  The mesh was tightened appropriately.  Excess mesh was excised.  The colpotomy was closed using a 3-0 Vicryl suture.  2 stab and skin incisions were closed using Dermabond.  Vaginal packing with estrogen cream was then placed.  Anesthesia was reversed.  Patient was taken to the recovery room in stable condition.  Lima catheter had been secured.    Sean Caceres Jr., MD     Date: 12/31/2024  Time: 10:45 EST

## 2024-12-31 NOTE — PLAN OF CARE
Problem: Adult Inpatient Plan of Care  Goal: Plan of Care Review  Outcome: Progressing  Flowsheets (Taken 12/31/2024 1851)  Outcome Evaluation: VSS, RA. Resing in bed with family at bedside. Ambulated from stretcher to bed upon arrival to floor. Berg and vaginal packing in place. No signs of bleeding at this time. Up with 1 staff asssist. CHG and berg care completed. No c/o of pain. Aroma therapy provided for mild nausea until iv antiemetics available  Plan of Care Reviewed With: patient  Goal: Patient-Specific Goal (Individualized)  Outcome: Progressing  Goal: Absence of Hospital-Acquired Illness or Injury  Outcome: Progressing  Intervention: Identify and Manage Fall Risk  Recent Flowsheet Documentation  Taken 12/31/2024 1442 by Rachel Calixto RN  Safety Promotion/Fall Prevention:   activity supervised   fall prevention program maintained   nonskid shoes/slippers when out of bed   safety round/check completed  Intervention: Prevent Skin Injury  Recent Flowsheet Documentation  Taken 12/31/2024 1442 by Rachel Calixto RN  Body Position: sitting up in bed  Intervention: Prevent and Manage VTE (Venous Thromboembolism) Risk  Recent Flowsheet Documentation  Taken 12/31/2024 1442 by Rachel Calixto RN  VTE Prevention/Management:   bilateral   SCDs (sequential compression devices) on  Goal: Optimal Comfort and Wellbeing  Outcome: Progressing  Intervention: Provide Person-Centered Care  Recent Flowsheet Documentation  Taken 12/31/2024 1442 by Rachel Calixto RN  Trust Relationship/Rapport:   care explained   thoughts/feelings acknowledged  Goal: Readiness for Transition of Care  Outcome: Progressing  Intervention: Mutually Develop Transition Plan  Recent Flowsheet Documentation  Taken 12/31/2024 1443 by Rachel Calixto RN  Transportation Anticipated: family or friend will provide  Patient/Family Anticipated Services at Transition: none  Patient/Family Anticipates Transition to: home with family   Goal Outcome  Evaluation:  Plan of Care Reviewed With: patient           Outcome Evaluation: MARTIN LI. Resing in bed with family at bedside. Ambulated from stretcher to bed upon arrival to floor. Berg and vaginal packing in place. No signs of bleeding at this time. Up with 1 staff asssist. CHG and berg care completed. No c/o of pain. Aroma therapy provided for mild nausea until iv antiemetics available

## 2024-12-31 NOTE — ANESTHESIA PROCEDURE NOTES
Airway  Urgency: elective    Date/Time: 12/31/2024 9:47 AM  Airway not difficult    General Information and Staff    Patient location during procedure: OR    Indications and Patient Condition  Indications for airway management: airway protection    Preoxygenated: yes  MILS not maintained throughout  Mask difficulty assessment: 1 - vent by mask    Final Airway Details  Final airway type: endotracheal airway      Successful airway: ETT  Cuffed: yes   Successful intubation technique: direct laryngoscopy  Endotracheal tube insertion site: oral  Blade: Shady  Blade size: 3  ETT size (mm): 7.0  Cormack-Lehane Classification: grade I - full view of glottis  Placement verified by: chest auscultation and capnometry   Measured from: lips  ETT/EBT  to lips (cm): 21  Number of attempts at approach: 1  Assessment: lips, teeth, and gum same as pre-op and atraumatic intubation    Additional Comments  Negative epigastric sounds, Breath sound equal bilaterally with symmetric chest rise and fall

## 2025-01-01 VITALS
DIASTOLIC BLOOD PRESSURE: 52 MMHG | BODY MASS INDEX: 20.76 KG/M2 | OXYGEN SATURATION: 92 % | HEART RATE: 70 BPM | SYSTOLIC BLOOD PRESSURE: 90 MMHG | WEIGHT: 145 LBS | TEMPERATURE: 98.1 F | HEIGHT: 70 IN | RESPIRATION RATE: 16 BRPM

## 2025-01-01 LAB
ANION GAP SERPL CALCULATED.3IONS-SCNC: 7 MMOL/L (ref 5–15)
BUN SERPL-MCNC: 7 MG/DL (ref 8–23)
BUN/CREAT SERPL: 10.6 (ref 7–25)
CALCIUM SPEC-SCNC: 8.1 MG/DL (ref 8.6–10.5)
CHLORIDE SERPL-SCNC: 102 MMOL/L (ref 98–107)
CO2 SERPL-SCNC: 23 MMOL/L (ref 22–29)
CREAT SERPL-MCNC: 0.66 MG/DL (ref 0.57–1)
DEPRECATED RDW RBC AUTO: 48.2 FL (ref 37–54)
EGFRCR SERPLBLD CKD-EPI 2021: 92.8 ML/MIN/1.73
ERYTHROCYTE [DISTWIDTH] IN BLOOD BY AUTOMATED COUNT: 14.4 % (ref 12.3–15.4)
GLUCOSE SERPL-MCNC: 106 MG/DL (ref 65–99)
HCT VFR BLD AUTO: 34.3 % (ref 34–46.6)
HGB BLD-MCNC: 11.2 G/DL (ref 12–15.9)
MCH RBC QN AUTO: 30.1 PG (ref 26.6–33)
MCHC RBC AUTO-ENTMCNC: 32.7 G/DL (ref 31.5–35.7)
MCV RBC AUTO: 92.2 FL (ref 79–97)
PLATELET # BLD AUTO: 244 10*3/MM3 (ref 140–450)
PMV BLD AUTO: 9 FL (ref 6–12)
POTASSIUM SERPL-SCNC: 4.5 MMOL/L (ref 3.5–5.2)
RBC # BLD AUTO: 3.72 10*6/MM3 (ref 3.77–5.28)
SODIUM SERPL-SCNC: 132 MMOL/L (ref 136–145)
WBC NRBC COR # BLD AUTO: 9.64 10*3/MM3 (ref 3.4–10.8)

## 2025-01-01 PROCEDURE — 80048 BASIC METABOLIC PNL TOTAL CA: CPT | Performed by: UROLOGY

## 2025-01-01 PROCEDURE — 25010000002 CEFOXITIN PER 1 G: Performed by: UROLOGY

## 2025-01-01 PROCEDURE — 25010000002 ENOXAPARIN PER 10 MG: Performed by: UROLOGY

## 2025-01-01 PROCEDURE — 85027 COMPLETE CBC AUTOMATED: CPT | Performed by: UROLOGY

## 2025-01-01 PROCEDURE — 25010000002 SODIUM CHLORIDE 0.9 % WITH KCL 20 MEQ 20-0.9 MEQ/L-% SOLUTION: Performed by: UROLOGY

## 2025-01-01 RX ADMIN — GABAPENTIN 100 MG: 100 CAPSULE ORAL at 08:35

## 2025-01-01 RX ADMIN — POTASSIUM CHLORIDE AND SODIUM CHLORIDE 100 ML/HR: 900; 150 INJECTION, SOLUTION INTRAVENOUS at 02:01

## 2025-01-01 RX ADMIN — CEFOXITIN 1000 MG: 1 INJECTION, POWDER, FOR SOLUTION INTRAVENOUS at 10:53

## 2025-01-01 RX ADMIN — CEFOXITIN 1000 MG: 1 INJECTION, POWDER, FOR SOLUTION INTRAVENOUS at 02:01

## 2025-01-01 RX ADMIN — ACETAMINOPHEN 650 MG: 325 TABLET ORAL at 08:35

## 2025-01-01 RX ADMIN — PANTOPRAZOLE SODIUM 40 MG: 40 TABLET, DELAYED RELEASE ORAL at 06:01

## 2025-01-01 RX ADMIN — ACETAMINOPHEN 650 MG: 325 TABLET ORAL at 02:01

## 2025-01-01 RX ADMIN — ENOXAPARIN SODIUM 40 MG: 100 INJECTION SUBCUTANEOUS at 08:35

## 2025-01-01 RX ADMIN — BETHANECHOL CHLORIDE 5 MG: 10 TABLET ORAL at 10:53

## 2025-01-01 NOTE — PROGRESS NOTES
Patient is voiding well without difficulty.  She has had minimal postoperative pain and is only had acetaminophen.  She is hoping to go home today.  All incisions appear to be healing nicely.    Status post robotic sacrocolpopexy and suburethral sling doing well.  Ready for discharge

## 2025-01-01 NOTE — DISCHARGE SUMMARY
Patient Name: Jeanne Love  MRN: [id number]  : 1950  DOS: 2025    Attending: Sean Caceres    Primary Care Provider: Anuradha Llanos, PIERRE    Date of Admission:.2024  7:06 AM    Date of Discharge:  2025    Discharge Diagnosis:   S/P sacrocolpopexy, robotic, with transobturator tape, cystoscopy.    Pelvic prolapse      Hospital Course    At admit:    Patient is a pleasant 74 y.o. female presented for scheduled surgery by Dr. Morris Galarza.   Per his note (This is a pleasant 74-year-old female with symptomatic uterovaginal prolapse and stress urinary incontinence with urethral hypermobility. She is opted for surgical therapy including robotic assisted laparoscopic sacrocolpopexy and suburethral sling with a transobturator approach. She understands risk benefits and alternatives and gives her full consent. She also understands the use of pelvic mesh and risks/benefits associated with this.).     She underwent robotic sacrocolpopexy with transobturator tape and cystoscopy.  Tolerated procedure well and was admitted for further management.     Seen in her room postop, doing well, good pain control, no complains of nausea, vomiting, or shortness of breath.     Patient is known to our practice from prior hospitalization for shoulder surgery.  She is generally healthy.    After Admit:    She was provided pain medication as needed for pain control.  Risks and benefits of opiate medications discussed with patient.    See report in chart was reviewed prior to discharge.    She received DVT prophylaxis with subcutaneous Lovenox as well as mechanicals.     She used an IS for atelectasis prophylaxis.    She was able to void spontaneously post catheter removal.     She was provided a bowel regimen and was encouraged to ambulate postop day 1 which she did.    Home medications were resumed as appropriate, and labs were monitored and remained fairly stable.     With the progress  "she has made, she is ready for DC home today.      Discussed with patient regarding plan and she shows understanding and agreement.     Procedures Performed      SACROCOLPOPEXY LAPAROSCOPIC WITH DAVINCI ROBOT TRANSVAGINAL TAPING WITH OBTURATOR  Procedure Note     Jeanne STARR Kate  2024     Pre-op Diagnosis:   Uterovaginal prolapse   stress urinary incontinence        Post-op Diagnosis:     Uterovaginal prolapse  Stress urinary incontinence     Procedure/CPT® Codes:        Procedure(s):  ROBOTIC SACROCOLPOPEXY , TRANSOBTURATOR TAPE (TOT), cystoscopy     Surgeon(s):  Sean Caceres Jr., MD             Pertinent Test Results:    I reviewed the patient's new clinical results.   Results from last 7 days   Lab Units 25  0954 24  1428   WBC 10*3/mm3 9.64 6.87   HEMOGLOBIN g/dL 11.2* 13.2   HEMATOCRIT % 34.3 39.1   PLATELETS 10*3/mm3 244 315     Results from last 7 days   Lab Units 25  0954 24  1428   SODIUM mmol/L 132* 139   POTASSIUM mmol/L 4.5 4.9   CHLORIDE mmol/L 102 103   CO2 mmol/L 23.0 26.0   BUN mg/dL 7* 18   CREATININE mg/dL 0.66 0.74   CALCIUM mg/dL 8.1* 9.2   GLUCOSE mg/dL 106* 98     I reviewed the patient's new imaging including images and reports.      Discharge Assessment:       Visit Vitals  BP 90/52 (BP Location: Right arm, Patient Position: Lying)   Pulse 72   Temp 98.1 °F (36.7 °C) (Axillary)   Resp 16   Ht 177.8 cm (70\")   Wt 65.8 kg (145 lb)   SpO2 98%   BMI 20.81 kg/m²     Temp (24hrs), Av.9 °F (36.6 °C), Min:97.5 °F (36.4 °C), Max:98.6 °F (37 °C)      General Appearance:    Alert, cooperative, in no acute distress   Lungs:     Clear to auscultation,respirations regular, even and unlabored    Heart:    Regular rhythm and normal rate, normal S1 and  S2   Abdomen:   Soft, benign, clean incisions.   Extremities:   Moves all extremities well, no edema, no cyanosis, no redness   Pulses:   Pulses palpable and equal bilaterally   Skin:   No bleeding, bruising or rash "          Discharge Disposition: Home          Discharge Medications        New Medications        Instructions Start Date   docusate sodium 100 MG capsule  Commonly known as: Colace   100 mg, Oral, Daily PRN      doxycycline 100 MG capsule  Commonly known as: VIBRAMYCIN   100 mg, Oral, Daily      HYDROcodone-acetaminophen 7.5-325 MG per tablet  Commonly known as: NORCO   1 tablet, Oral, Every 6 Hours PRN             Continue These Medications        Instructions Start Date   bethanechol 5 MG tablet  Commonly known as: URECHOLINE   5 mg, 3 Times Daily      cetirizine 10 MG tablet  Commonly known as: zyrTEC   10 mg, Daily PRN      estradiol 0.5 MG tablet  Commonly known as: ESTRACE   0.5 mg, 2 Times Weekly      medroxyPROGESTERone 5 MG tablet  Commonly known as: PROVERA   5 mg, Daily               Discharge Diet: Soft, bland diet    Activity at Discharge: ambulate    Follow-up Appointments  Dr. Morris Boyd per his orders       Ivory Baugh MD  01/01/25  12:20 EST

## (undated) DEVICE — TROC BLADLES ANCHORPORT/OPTI LP 8X120MM 1P/U

## (undated) DEVICE — GLV SURG SENSICARE PI MIC PF SZ7.5 LF STRL

## (undated) DEVICE — VESSEL SEALER EXTEND: Brand: ENDOWRIST

## (undated) DEVICE — PATIENT RETURN ELECTRODE, SINGLE-USE, CONTACT QUALITY MONITORING, ADULT, WITH 9FT CORD, FOR PATIENTS WEIGING OVER 33LBS. (15KG): Brand: MEGADYNE

## (undated) DEVICE — 1LYRTR 16FR10ML100%SIL UMS SNP: Brand: MEDLINE INDUSTRIES, INC.

## (undated) DEVICE — LAPAROVUE VISIBILITY SYSTEM LAPAROSCOPIC SOLUTIONS: Brand: LAPAROVUE

## (undated) DEVICE — ST TBG AIRSEAL FLTR TRI LUM

## (undated) DEVICE — ARM DRAPE

## (undated) DEVICE — SEAL

## (undated) DEVICE — Device

## (undated) DEVICE — ANTIBACTERIAL UNDYED BRAIDED (POLYGLACTIN 910), SYNTHETIC ABSORBABLE SUTURE: Brand: COATED VICRYL

## (undated) DEVICE — COVER,MAYO STAND,XL,STERILE: Brand: MEDLINE

## (undated) DEVICE — TIP COVER ACCESSORY

## (undated) DEVICE — PK UROL DAVINCI 10

## (undated) DEVICE — GOWN,NON-REINFORCED,SIRUS,SET IN SLV,XL: Brand: MEDLINE

## (undated) DEVICE — DRAPE,TOP,102X53,STERILE: Brand: MEDLINE

## (undated) DEVICE — BLANKT WARM UPPR/BDY ARM/OUT 57X196CM

## (undated) DEVICE — PACKING,VAGINAL,XR,ST,4"X36",100EA: Brand: MEDLINE

## (undated) DEVICE — COLUMN DRAPE

## (undated) DEVICE — DRAPE,UNDERBUTTOCKS,PCH,STERILE: Brand: MEDLINE

## (undated) DEVICE — LEGGINGS, PAIR, 29X43, STERILE: Brand: MEDLINE

## (undated) DEVICE — BLADELESS OBTURATOR: Brand: WECK VISTA